# Patient Record
Sex: FEMALE | Race: WHITE | NOT HISPANIC OR LATINO | Employment: OTHER | URBAN - METROPOLITAN AREA
[De-identification: names, ages, dates, MRNs, and addresses within clinical notes are randomized per-mention and may not be internally consistent; named-entity substitution may affect disease eponyms.]

---

## 2021-06-12 ENCOUNTER — NURSE TRIAGE (OUTPATIENT)
Dept: NURSING | Facility: CLINIC | Age: 31
End: 2021-06-12

## 2021-06-12 NOTE — TELEPHONE ENCOUNTER
Explained that I am unable to see her [facility] medical record so advised that she contact her PCP office on Monday to clarify her medication dose.   Nae Grider RN  [facility]

## 2023-08-18 ENCOUNTER — TRANSFERRED RECORDS (OUTPATIENT)
Dept: HEALTH INFORMATION MANAGEMENT | Facility: CLINIC | Age: 33
End: 2023-08-18

## 2023-10-30 ENCOUNTER — MEDICAL CORRESPONDENCE (OUTPATIENT)
Dept: HEALTH INFORMATION MANAGEMENT | Facility: CLINIC | Age: 33
End: 2023-10-30

## 2023-10-30 ENCOUNTER — TRANSFERRED RECORDS (OUTPATIENT)
Dept: HEALTH INFORMATION MANAGEMENT | Facility: CLINIC | Age: 33
End: 2023-10-30

## 2023-10-31 ENCOUNTER — TRANSCRIBE ORDERS (OUTPATIENT)
Dept: MATERNAL FETAL MEDICINE | Facility: CLINIC | Age: 33
End: 2023-10-31

## 2023-10-31 DIAGNOSIS — O26.90 PREGNANCY RELATED CONDITION, ANTEPARTUM: Primary | ICD-10-CM

## 2023-11-08 ENCOUNTER — PRE VISIT (OUTPATIENT)
Dept: MATERNAL FETAL MEDICINE | Facility: CLINIC | Age: 33
End: 2023-11-08
Payer: COMMERCIAL

## 2023-11-08 DIAGNOSIS — Z14.1 CYSTIC FIBROSIS CARRIER: Primary | ICD-10-CM

## 2023-11-17 ENCOUNTER — LAB (OUTPATIENT)
Dept: LAB | Facility: CLINIC | Age: 33
End: 2023-11-17
Attending: OBSTETRICS & GYNECOLOGY
Payer: COMMERCIAL

## 2023-11-17 ENCOUNTER — HOSPITAL ENCOUNTER (OUTPATIENT)
Dept: ULTRASOUND IMAGING | Facility: CLINIC | Age: 33
Discharge: HOME | End: 2023-11-17
Attending: OBSTETRICS & GYNECOLOGY
Payer: COMMERCIAL

## 2023-11-17 ENCOUNTER — OFFICE VISIT (OUTPATIENT)
Dept: MATERNAL FETAL MEDICINE | Facility: CLINIC | Age: 33
End: 2023-11-17
Attending: STUDENT IN AN ORGANIZED HEALTH CARE EDUCATION/TRAINING PROGRAM
Payer: COMMERCIAL

## 2023-11-17 ENCOUNTER — OFFICE VISIT (OUTPATIENT)
Dept: MATERNAL FETAL MEDICINE | Facility: CLINIC | Age: 33
End: 2023-11-17
Attending: OBSTETRICS & GYNECOLOGY
Payer: COMMERCIAL

## 2023-11-17 ENCOUNTER — MEDICAL CORRESPONDENCE (OUTPATIENT)
Dept: HEALTH INFORMATION MANAGEMENT | Facility: CLINIC | Age: 33
End: 2023-11-17

## 2023-11-17 VITALS — SYSTOLIC BLOOD PRESSURE: 128 MMHG | DIASTOLIC BLOOD PRESSURE: 82 MMHG | HEART RATE: 101 BPM

## 2023-11-17 DIAGNOSIS — Z14.1 CYSTIC FIBROSIS CARRIER: ICD-10-CM

## 2023-11-17 DIAGNOSIS — Z36.9 ENCOUNTER FOR ANTENATAL SCREENING OF MOTHER: Primary | ICD-10-CM

## 2023-11-17 DIAGNOSIS — O10.919 CHRONIC HYPERTENSION AFFECTING PREGNANCY: Primary | ICD-10-CM

## 2023-11-17 DIAGNOSIS — Z36.9 ENCOUNTER FOR ANTENATAL SCREENING OF MOTHER: ICD-10-CM

## 2023-11-17 DIAGNOSIS — Z36.2 ENCOUNTER FOR FOLLOW-UP ULTRASOUND OF FETAL ANATOMY: ICD-10-CM

## 2023-11-17 DIAGNOSIS — O26.90 PREGNANCY RELATED CONDITION, ANTEPARTUM: ICD-10-CM

## 2023-11-17 PROCEDURE — 999N000069 HC STATISTIC GENETIC COUNSELING, < 16 MIN

## 2023-11-17 PROCEDURE — 99203 OFFICE O/P NEW LOW 30 MIN: CPT | Mod: 25 | Performed by: STUDENT IN AN ORGANIZED HEALTH CARE EDUCATION/TRAINING PROGRAM

## 2023-11-17 PROCEDURE — 36415 COLL VENOUS BLD VENIPUNCTURE: CPT

## 2023-11-17 PROCEDURE — 76811 OB US DETAILED SNGL FETUS: CPT | Mod: 26 | Performed by: STUDENT IN AN ORGANIZED HEALTH CARE EDUCATION/TRAINING PROGRAM

## 2023-11-17 PROCEDURE — 76811 OB US DETAILED SNGL FETUS: CPT

## 2023-11-17 NOTE — PROGRESS NOTES
"Please see \"Imaging\" tab under \"Chart Review\" for details of today's visit.    Saundra Cai      "

## 2023-11-17 NOTE — NURSING NOTE
Irina here for MFM ultrasound today. Reports feeling off at times and finds it difficult to perform activities at times. She reports taking her blood pressures at home with some being in the 140's/90's and below. B/P today normal as documented. SBAR with Dr. Cai, see her documentation in U/S report for details.    This RN had placed call to [facility] on 11/8 and spoke with KARIN Mcdaniel, Call was placed to clarify if Dr. José desired formal MFM consult for patient. Plan made for clinic to call back if consult is desired as message needed to be routed to provider.    Received call at 1325 today (11/17/23) from Violeta stating Dr. José did want a consult. Advised that patient had already been seen today but is scheduled for follow up ultrasound and formal consult in 2 weeks.

## 2023-11-17 NOTE — PROGRESS NOTES
[facility]  Genetic Counseling Consult    Patient:  Irina Alcantara YOB: 1990   Date of Service:  23   MRN: [id number]    Irina was seen at the [facility] for genetic consultation. The indication for genetic counseling is  known carrier of cystic fibrosis (CFTR c.R117H[5T]) . The patient was accompanied to this visit by her , Renzo.    The session was conducted in English.        IMPRESSION/ PLAN   1. Irina has not had genetic screening in this pregnancy but elected to have screening today.     2. During today's [facility] visit, Irina had a blood draw for expanded non-invasive prenatal screening (NIPS) through [facility]. The expanded NIPS screens for trisomy 21, 18, and 13 and 22q11.2 deletion syndrome. The patient opted to screen for sex chromosome aneuploidies, including reported fetal sex.  In accordance with current guidelines, other microdeletion syndromes and rare autosomal trisomies were not included, but reflex to include these conditions remains available with expanded NIPS if there is an indication later in the pregnancy. Results are expected in 5-10 days. The patient will be called with results and if they do not answer they requested a detailed message with results on their voicemail, including the predicted fetal sex information. Irina was informed that results, including fetal sex, will be available in [facility].    3. Irina had a level II comprehensive anatomy ultrasound today. Please see the ultrasound report for further details.    4. Further recommendation include a follow-up ultrasound with [facility]. The upcoming ultrasound has been scheduled for 2023.    PREGNANCY HISTORY   /Parity:       No bleeding, fevers, or exposures of concern were reported at today's visit. Irina's pregnancy history is significant for:   One chemical pregnancy loss  One  at 37w4d,  complicated by maternal hypertension    CURRENT PREGNANCY   Current Age: 33 year old   Age at Delivery: 33 year old  KASANDRA: 4/4/2024, by Ultrasound                                   Gestational Age: 20w1d  This pregnancy is a single gestation.   This pregnancy was conceived spontaneously.    MEDICAL HISTORY   Irina has a history of gestational hypertension which is continuing to affect her in her current pregnancy. Baseline hypertension labs were taken on 08/18 which were unremarkable. Her primary OB recommended outpatient blood pressure monitoring in addition to being seen by MFM.       FAMILY HISTORY   A three-generation family history was not obtained today due to our focus on other topics and time constraints. Broadly, the reported family history is unremarkable for multiple miscarriages, stillbirths, intellectual disabilities, autism spectrum disorder, developmental delays, cancer diagnosed under 50, and consanguinity. The family history was reported by Irina and Renzo.    Of note, Renzo shared that his father and several paternal aunts/uncles have histories of early onset Alzheimer's and have had positive genetic testing which revealed a pathogenic variant in a gene that causes and increased risk for Alzheimer's (Renzo was unsure which gene). The family has participated in the LI study (Dominantly Inherited Alzheimer Network, [email or web address]). Renzo's father was diagnosed with Alzheimer's in his 50s and has since passed away. Renzo understands that he has a hypothetical 50% chance to have inherited the familial pathogenic variant and has gone back and forth on whether or not he wants to be tested. We did discuss there are also implications for Renzo's children, if he inherited the variant his children would have a 50% chance of also having inherited the variant. We discussed the federal law BRITTA (Genetic Information Nondiscrimination Act) and important gaps in the protections it affords.    Also of note,  "Irina shared a maternal great-aunt has been diagnosed with frontotemporal dementia (FTD). Most cases of FTD are sporadic, however it can also be familial. In 15-40% of familial cases of FTD there is an underlying genetic cause, most commonly pathogenic variants in B3tnh22, MAPT, and GRN. There are additional genes that are more rarely involved. Single gene causes of FTD are inherited in an autosomal dominant manner, meaning first degree relatives of an affected family member would have a 50% chance to also have the familial variant. It is difficult to say whether or not Irina's great-aunt has a single gene cause of FTD, but she would be the best candidate for genetic testing.     Finally, Renzo shared he has a paternal uncle who has a \"small hand\" and Renzo's half-sister's son had to get surgery to repair holes in his heart. We discussed that, since there is no other family history of structural differences to hands or feet, it is more likely that his uncle's smaller hand is due to teratogenic factors or amniotic banding. In terms of his nephew, we discussed that this relative is far enough removed from the current pregnancy that there could be a slightly increased chance for the current pregnancy to have a heart defect compared to the general population but not as high of a chance as it would be for first degree or second degree relatives.     RISK ASSESSMENT FOR CHROMOSOME CONDITIONS   We explained that the risk for fetal chromosome abnormalities increases with maternal age. We discussed specific features of common chromosome abnormalities, including Down syndrome, trisomy 13, trisomy 18, and sex chromosome trisomies.    At age 33 at midtrimester, the risk to have a baby with Down syndrome is 1 in 597.  At age 33 at midtrimester, the risk to have a baby with any chromosome abnormality is 1 in 299.     We also discussed that current ACMG guidelines recommend that screening for 22q11.2 deletion syndrome be offered " to all pregnant patients. 22q11.2 deletion syndrome has an estimated prevalence of 1 in 990 to 1 in 2148 (0.05-0.1%). Risk is not thought to increase with maternal age. Clinical features are variable but include congenital heart defects, cleft palate, developmental delays, immune system deficiencies, and hearing loss. Approximately 90% of cases are de kwame (a sporadic new change in a pregnancy). Cell-free DNA screening for 22q11.2 deletion syndrome is available (Expanded NIPS through [employer]). We discussed the limitations of cell-free DNA screening in detecting microdeletions and the possiblity of false positives and false negatives. The data on this condition is more limited, so there is not a positive or negative predictive value available for results interpretation.    Irina has not had genetic screening in this pregnancy but elected to have screening today.      GENETIC TESTING OPTIONS FOR CHROMOSOMAL CONDITIONS   Genetic testing during a pregnancy includes screening and diagnostic procedures.      Screening tests are non-invasive which means no risk to the pregnancy and includes ultrasounds and blood work. The benefits and limitations of screening were reviewed. Screening tests provide a risk assessment (chance) specific to the pregnancy for certain fetal chromosome abnormalities but cannot definitively diagnose or exclude a fetal chromosome abnormality. Follow-up genetic counseling and consideration of diagnostic testing is recommended with any abnormal screening result. Diagnostic testing during a pregnancy is more certain and can test for more conditions. However, the tests do have a risk of miscarriage that requires careful consideration. These tests can detect fetal chromosome abnormalities with greater than 99% certainty. Results can be compromised by maternal cell contamination or mosaicism and are limited by the resolution of current genetic testing technology.     There is no screening or  diagnostic test that detects all forms of birth defects or intellectual disability.     We discussed the following screening options:   Non-invasive prenatal screening (NIPS)  Also called cell-free DNA screening because it detects chromosomes from the placenta in the pregnant person's blood  Can be done any time after 10 weeks gestation  Screens for trisomy 21, trisomy 18, trisomy 13, and sex chromosome aneuploidies  Expanded NIPS also allows for reflex to include other microdeletion conditions and rare autosomal trisomies if an indication would arise later in the pregnancy. The patient opted to include screening for 22q11.2 deletion syndrome.   Cannot screen for open neural tube defects, maternal serum AFP after 15 weeks is recommended    We discussed the following ultrasound options:  Comprehensive level II ultrasound (Fetal Anatomy Ultrasound)  Ultrasound done between 18-20 weeks gestation  Screens for major birth defects and markers for aneuploidy (like trisomy 21 and trisomy 18)  Includes looking at the fetus/baby's growth, heart, organs (stomach, kidneys), placenta, and amniotic fluid    We discussed the following diagnostic options:   Amniocentesis  Invasive diagnostic procedure done after 15 weeks gestation  The procedure collects a small sample of amniotic fluid for the purpose of chromosomal testing and/or other genetic testing  Diagnostic result; more than 99% sensitivity for fetal chromosome abnormalities  Testing for AFP in the amniotic fluid can test for open neural tube defects    CARRIER SCREENING   Expanded carrier screening is available to screen for autosomal recessive conditions and X-linked conditions in a large list of genes. Autosomal recessive conditions happen when a mutation has been inherited from the egg and sperm and include conditions like cystic fibrosis, thalassemia, hearing loss, spinal muscular atrophy, and more. X-linked conditions happen when a mutation has been inherited from the  egg and include conditions like fragile X syndrome. [place] screening was also reviewed, though Irina was quite familiar with  screening given her son's experience with it.  screening was how her son was found to be a carrier for cystic fibrosis. Irina was subsequently found to also be a carrier of cystic fibrosis (CFTR c.R117H[5T]). About [place] Aspen Screening    We discussed that, since Renzo has not yet had CFTR sequencing, the hypothetical risk for the couple to have a child with cystic fibrosis knowing Irina is a carrier and knowing 1/25 people with  ancestry are carriers is 1/100 (1%). We discussed that if Renzo is found to be a carrier of cystic fibrosis the chance for them to have a child with cystic fibrosis would be 1/4 (25%). If Renzo were found not to be a carrier the risk would be greatly reduced (0.001%). The couple shared that if they knew the pregnancy had cystic fibrosis it would not change their pregnancy decisions and are wanting to wait until  screening.    We also discussed carrier screening for conditions other than cystic fibrosis. The couple declined the carrier screening options shared today. They are aware the option will remain, and they can contact us if they would like to pursue screening. We discussed the following:   Carrier screening does not test the pregnancy but gives a risk assessment for the pregnancy and future pregnancies to have the condition  There are different size panels or list of conditions for carrier screening  Some conditions cause health problems for carriers  Carrier screening does not test for all genetic and health conditions or risk factors  There are limitations to current technology and results may be updated at a later date  If an individual is a carrier, family members could be as well so it may be helpful for the patient to share their results with relatives         It was a pleasure to be involved with Irina s care.  Face-to-face time of the meeting was 35 minutes.    Agatha Brooke MS, [facility]  Maternal Fetal Medicine  Office: [phone]  MFM: [phone]   Fax: [phone]  [facility]    Patient seen, evaluated and discussed with the Genetic Counseling Intern. I have verified the content of the note, which accurately reflects my assessment of the patient and the plan of care.  Supervising Genetic Counselor  Aminta Mei MS, [facility]  Licensed Genetic Counselor   [facility]  Maternal Fetal Medicine  [email or web address]  [email or web address]  Office: [phone]  Pager [phone]  [facility]: [phone]   Fax: [phone]

## 2023-11-22 ENCOUNTER — TELEPHONE (OUTPATIENT)
Dept: MATERNAL FETAL MEDICINE | Facility: CLINIC | Age: 33
End: 2023-11-22
Payer: COMMERCIAL

## 2023-11-22 LAB — SCANNED LAB RESULT: NORMAL

## 2023-11-22 NOTE — CONFIDENTIAL NOTE
November 22, 2023    I left a message for Irina regarding her non-invasive prenatal screen (NIPS) results.     Results indicate NO ANEUPLOIDY DETECTED for chromosomes 21, 18, 13, or sex chromosomes (XX - FEMALE). Results were also negative for 22q11.2 deletion syndrome.    This puts her current pregnancy at low risk for Down syndrome, trisomy 18, trisomy 13, sex chromosome abnormalities, and 22q11.2 deletion syndrome. This test is reported to have the following sensitivities: Down syndrome: 99.99%, trisomy 18: 99.99%, and trisomy 13: 99.99%. Although these results are reassuring, this does not replace a standard chromosome analysis from a chorionic villus sampling or amniocentesis.     Irina is scheduled for a follow-up ultrasound with [facility] on 12/04/2023.    Her results will be made available in her Epic chart for her primary OB to review.       Agatha Brooke MS, [facility]  Maternal Fetal Medicine  Office: [phone]  [facility]: [phone]   Fax: [phone]  [facility]

## 2023-12-04 ENCOUNTER — HOSPITAL ENCOUNTER (OUTPATIENT)
Dept: ULTRASOUND IMAGING | Facility: CLINIC | Age: 33
Discharge: HOME | End: 2023-12-04
Attending: STUDENT IN AN ORGANIZED HEALTH CARE EDUCATION/TRAINING PROGRAM
Payer: COMMERCIAL

## 2023-12-04 ENCOUNTER — OFFICE VISIT (OUTPATIENT)
Dept: MATERNAL FETAL MEDICINE | Facility: CLINIC | Age: 33
End: 2023-12-04
Attending: STUDENT IN AN ORGANIZED HEALTH CARE EDUCATION/TRAINING PROGRAM
Payer: COMMERCIAL

## 2023-12-04 VITALS — OXYGEN SATURATION: 97 % | SYSTOLIC BLOOD PRESSURE: 108 MMHG | HEART RATE: 134 BPM | DIASTOLIC BLOOD PRESSURE: 81 MMHG

## 2023-12-04 DIAGNOSIS — E66.01 MORBIDLY OBESE (H): ICD-10-CM

## 2023-12-04 DIAGNOSIS — O10.919 CHRONIC HYPERTENSION AFFECTING PREGNANCY: ICD-10-CM

## 2023-12-04 DIAGNOSIS — Z36.2 ENCOUNTER FOR FOLLOW-UP ULTRASOUND OF FETAL ANATOMY: ICD-10-CM

## 2023-12-04 DIAGNOSIS — O99.212 MATERNAL OBESITY SYNDROME IN SECOND TRIMESTER: ICD-10-CM

## 2023-12-04 DIAGNOSIS — R00.0 TACHYCARDIA: Primary | ICD-10-CM

## 2023-12-04 PROCEDURE — 76816 OB US FOLLOW-UP PER FETUS: CPT | Mod: 26 | Performed by: OBSTETRICS & GYNECOLOGY

## 2023-12-04 PROCEDURE — 76816 OB US FOLLOW-UP PER FETUS: CPT

## 2023-12-04 PROCEDURE — 99215 OFFICE O/P EST HI 40 MIN: CPT | Mod: 25 | Performed by: OBSTETRICS & GYNECOLOGY

## 2023-12-04 PROCEDURE — 99417 PROLNG OP E/M EACH 15 MIN: CPT | Mod: 25 | Performed by: OBSTETRICS & GYNECOLOGY

## 2023-12-04 PROCEDURE — G0463 HOSPITAL OUTPT CLINIC VISIT: HCPCS | Mod: 25 | Performed by: OBSTETRICS & GYNECOLOGY

## 2023-12-04 NOTE — NURSING NOTE
Patient presents to MFM for MFM consult/RL2 at 22w4d due to CHTN, subopt. Positive fetal movement. Denies LOF, vaginal bleeding or cramping/contractions. SBAR given to MFM MD, see their note in Epic.

## 2023-12-09 ENCOUNTER — HEALTH MAINTENANCE LETTER (OUTPATIENT)
Age: 33
End: 2023-12-09

## 2024-01-17 ENCOUNTER — APPOINTMENT (OUTPATIENT)
Dept: ULTRASOUND IMAGING | Facility: CLINIC | Age: 34
DRG: 833 | End: 2024-01-17
Payer: COMMERCIAL

## 2024-01-17 ENCOUNTER — ORDERS ONLY (AUTO-RELEASED) (OUTPATIENT)
Dept: MATERNAL FETAL MEDICINE | Facility: CLINIC | Age: 34
End: 2024-01-17

## 2024-01-17 ENCOUNTER — HOSPITAL ENCOUNTER (INPATIENT)
Facility: CLINIC | Age: 34
LOS: 1 days | Discharge: HOME | DRG: 833 | End: 2024-01-18
Attending: OBSTETRICS & GYNECOLOGY | Admitting: OBSTETRICS & GYNECOLOGY
Payer: COMMERCIAL

## 2024-01-17 DIAGNOSIS — R00.2 PALPITATIONS: ICD-10-CM

## 2024-01-17 DIAGNOSIS — O10.919 CHRONIC HYPERTENSION IN PREGNANCY: ICD-10-CM

## 2024-01-17 DIAGNOSIS — O10.919 CHRONIC HYPERTENSION IN PREGNANCY: Primary | ICD-10-CM

## 2024-01-17 LAB
ABO/RH(D): NORMAL
ALBUMIN MFR UR ELPH: 17.2 MG/DL
ALBUMIN SERPL BCG-MCNC: 3.4 G/DL (ref 3.5–5.2)
ALP SERPL-CCNC: 87 U/L (ref 40–150)
ALT SERPL W P-5'-P-CCNC: 6 U/L (ref 0–50)
ANION GAP SERPL CALCULATED.3IONS-SCNC: 11 MMOL/L (ref 7–15)
ANTIBODY SCREEN: NEGATIVE
AST SERPL W P-5'-P-CCNC: 9 U/L (ref 0–45)
BASOPHILS # BLD AUTO: 0 10E3/UL (ref 0–0.2)
BASOPHILS NFR BLD AUTO: 0 %
BILIRUB SERPL-MCNC: <0.2 MG/DL
BUN SERPL-MCNC: 5.4 MG/DL (ref 6–20)
CALCIUM SERPL-MCNC: 9 MG/DL (ref 8.6–10)
CHLORIDE SERPL-SCNC: 101 MMOL/L (ref 98–107)
CREAT SERPL-MCNC: 0.55 MG/DL (ref 0.51–0.95)
CREAT UR-MCNC: 148.6 MG/DL
DEPRECATED HCO3 PLAS-SCNC: 20 MMOL/L (ref 22–29)
EGFRCR SERPLBLD CKD-EPI 2021: >90 ML/MIN/1.73M2
EOSINOPHIL # BLD AUTO: 0 10E3/UL (ref 0–0.7)
EOSINOPHIL NFR BLD AUTO: 0 %
ERYTHROCYTE [DISTWIDTH] IN BLOOD BY AUTOMATED COUNT: 12.6 % (ref 10–15)
GLUCOSE SERPL-MCNC: 119 MG/DL (ref 70–99)
HCT VFR BLD AUTO: 36.2 % (ref 35–47)
HGB BLD-MCNC: 11.9 G/DL (ref 11.7–15.7)
IMM GRANULOCYTES # BLD: 0.2 10E3/UL
IMM GRANULOCYTES NFR BLD: 1 %
LYMPHOCYTES # BLD AUTO: 1.8 10E3/UL (ref 0.8–5.3)
LYMPHOCYTES NFR BLD AUTO: 11 %
MCH RBC QN AUTO: 29.9 PG (ref 26.5–33)
MCHC RBC AUTO-ENTMCNC: 32.9 G/DL (ref 31.5–36.5)
MCV RBC AUTO: 91 FL (ref 78–100)
MONOCYTES # BLD AUTO: 0.3 10E3/UL (ref 0–1.3)
MONOCYTES NFR BLD AUTO: 2 %
NEUTROPHILS # BLD AUTO: 14.4 10E3/UL (ref 1.6–8.3)
NEUTROPHILS NFR BLD AUTO: 86 %
NRBC # BLD AUTO: 0 10E3/UL
NRBC BLD AUTO-RTO: 0 /100
PLATELET # BLD AUTO: 283 10E3/UL (ref 150–450)
POTASSIUM SERPL-SCNC: 4.3 MMOL/L (ref 3.4–5.3)
PROT SERPL-MCNC: 6.5 G/DL (ref 6.4–8.3)
PROT/CREAT 24H UR: 0.12 MG/MG CR (ref 0–0.2)
RBC # BLD AUTO: 3.98 10E6/UL (ref 3.8–5.2)
SODIUM SERPL-SCNC: 132 MMOL/L (ref 135–145)
SPECIMEN EXPIRATION DATE: NORMAL
TSH SERPL DL<=0.005 MIU/L-ACNC: 1.34 UIU/ML (ref 0.3–4.2)
WBC # BLD AUTO: 16.7 10E3/UL (ref 4–11)

## 2024-01-17 PROCEDURE — 84156 ASSAY OF PROTEIN URINE: CPT

## 2024-01-17 PROCEDURE — 84443 ASSAY THYROID STIM HORMONE: CPT

## 2024-01-17 PROCEDURE — 99222 1ST HOSP IP/OBS MODERATE 55: CPT | Mod: 25 | Performed by: OBSTETRICS & GYNECOLOGY

## 2024-01-17 PROCEDURE — G0463 HOSPITAL OUTPT CLINIC VISIT: HCPCS | Mod: 25

## 2024-01-17 PROCEDURE — 85025 COMPLETE CBC W/AUTO DIFF WBC: CPT

## 2024-01-17 PROCEDURE — 120N000002 HC R&B MED SURG/OB UMMC

## 2024-01-17 PROCEDURE — 250N000013 HC RX MED GY IP 250 OP 250 PS 637

## 2024-01-17 PROCEDURE — 76816 OB US FOLLOW-UP PER FETUS: CPT

## 2024-01-17 PROCEDURE — 93248 EXT ECG>7D<15D REV&INTERPJ: CPT | Performed by: INTERNAL MEDICINE

## 2024-01-17 PROCEDURE — 250N000011 HC RX IP 250 OP 636

## 2024-01-17 PROCEDURE — 36415 COLL VENOUS BLD VENIPUNCTURE: CPT

## 2024-01-17 PROCEDURE — 258N000003 HC RX IP 258 OP 636

## 2024-01-17 PROCEDURE — 86900 BLOOD TYPING SEROLOGIC ABO: CPT

## 2024-01-17 PROCEDURE — 59025 FETAL NON-STRESS TEST: CPT | Mod: 26 | Performed by: OBSTETRICS & GYNECOLOGY

## 2024-01-17 PROCEDURE — 87653 STREP B DNA AMP PROBE: CPT

## 2024-01-17 PROCEDURE — 80053 COMPREHEN METABOLIC PANEL: CPT

## 2024-01-17 PROCEDURE — 76816 OB US FOLLOW-UP PER FETUS: CPT | Mod: 26 | Performed by: OBSTETRICS & GYNECOLOGY

## 2024-01-17 RX ORDER — METOCLOPRAMIDE HYDROCHLORIDE 5 MG/ML
10 INJECTION INTRAMUSCULAR; INTRAVENOUS EVERY 6 HOURS PRN
Status: DISCONTINUED | OUTPATIENT
Start: 2024-01-17 | End: 2024-01-18 | Stop reason: HOSPADM

## 2024-01-17 RX ORDER — HYDROXYZINE HYDROCHLORIDE 50 MG/1
50 TABLET, FILM COATED ORAL
Status: DISCONTINUED | OUTPATIENT
Start: 2024-01-17 | End: 2024-01-17

## 2024-01-17 RX ORDER — LABETALOL 100 MG/1
100 TABLET, FILM COATED ORAL EVERY 8 HOURS SCHEDULED
Status: DISCONTINUED | OUTPATIENT
Start: 2024-01-17 | End: 2024-01-17

## 2024-01-17 RX ORDER — ONDANSETRON 4 MG/1
4 TABLET, ORALLY DISINTEGRATING ORAL EVERY 6 HOURS PRN
Status: DISCONTINUED | OUTPATIENT
Start: 2024-01-17 | End: 2024-01-18 | Stop reason: HOSPADM

## 2024-01-17 RX ORDER — GUANFACINE 1 MG/1
1 TABLET ORAL AT BEDTIME
COMMUNITY

## 2024-01-17 RX ORDER — POLYETHYLENE GLYCOL 3350 17 G/17G
17 POWDER, FOR SOLUTION ORAL DAILY
Status: DISCONTINUED | OUTPATIENT
Start: 2024-01-17 | End: 2024-01-18 | Stop reason: HOSPADM

## 2024-01-17 RX ORDER — PROCHLORPERAZINE MALEATE 10 MG
10 TABLET ORAL EVERY 6 HOURS PRN
Status: DISCONTINUED | OUTPATIENT
Start: 2024-01-17 | End: 2024-01-18 | Stop reason: HOSPADM

## 2024-01-17 RX ORDER — LIDOCAINE 40 MG/G
CREAM TOPICAL
Status: DISCONTINUED | OUTPATIENT
Start: 2024-01-17 | End: 2024-01-18 | Stop reason: HOSPADM

## 2024-01-17 RX ORDER — ONDANSETRON 2 MG/ML
4 INJECTION INTRAMUSCULAR; INTRAVENOUS EVERY 6 HOURS PRN
Status: DISCONTINUED | OUTPATIENT
Start: 2024-01-17 | End: 2024-01-18 | Stop reason: HOSPADM

## 2024-01-17 RX ORDER — HYDROXYZINE HYDROCHLORIDE 50 MG/1
50 TABLET, FILM COATED ORAL 3 TIMES DAILY PRN
COMMUNITY

## 2024-01-17 RX ORDER — ASPIRIN 81 MG/1
81 TABLET, CHEWABLE ORAL AT BEDTIME
Status: DISCONTINUED | OUTPATIENT
Start: 2024-01-17 | End: 2024-01-18 | Stop reason: HOSPADM

## 2024-01-17 RX ORDER — POLYETHYLENE GLYCOL 3350 17 G/17G
17 POWDER, FOR SOLUTION ORAL
Status: DISCONTINUED | OUTPATIENT
Start: 2024-01-17 | End: 2024-01-18 | Stop reason: HOSPADM

## 2024-01-17 RX ORDER — PRENATAL VIT/IRON FUM/FOLIC AC 27MG-0.8MG
1 TABLET ORAL DAILY
Status: DISCONTINUED | OUTPATIENT
Start: 2024-01-17 | End: 2024-01-17

## 2024-01-17 RX ORDER — PANTOPRAZOLE SODIUM 40 MG/1
40 TABLET, DELAYED RELEASE ORAL AT BEDTIME
Status: DISCONTINUED | OUTPATIENT
Start: 2024-01-17 | End: 2024-01-18 | Stop reason: HOSPADM

## 2024-01-17 RX ORDER — LABETALOL 100 MG/1
100 TABLET, FILM COATED ORAL EVERY 12 HOURS SCHEDULED
Status: DISCONTINUED | OUTPATIENT
Start: 2024-01-17 | End: 2024-01-17

## 2024-01-17 RX ORDER — BETAMETHASONE SODIUM PHOSPHATE AND BETAMETHASONE ACETATE 3; 3 MG/ML; MG/ML
12 INJECTION, SUSPENSION INTRA-ARTICULAR; INTRALESIONAL; INTRAMUSCULAR; SOFT TISSUE ONCE
Status: DISCONTINUED | OUTPATIENT
Start: 2024-01-17 | End: 2024-01-17

## 2024-01-17 RX ORDER — VENLAFAXINE HYDROCHLORIDE 150 MG/1
150 CAPSULE, EXTENDED RELEASE ORAL
Status: DISCONTINUED | OUTPATIENT
Start: 2024-01-17 | End: 2024-01-17

## 2024-01-17 RX ORDER — VENLAFAXINE HYDROCHLORIDE 150 MG/1
150 CAPSULE, EXTENDED RELEASE ORAL AT BEDTIME
Status: DISCONTINUED | OUTPATIENT
Start: 2024-01-17 | End: 2024-01-18 | Stop reason: HOSPADM

## 2024-01-17 RX ORDER — MAGNESIUM SULFATE HEPTAHYDRATE 40 MG/ML
2 INJECTION, SOLUTION INTRAVENOUS
Status: DISCONTINUED | OUTPATIENT
Start: 2024-01-17 | End: 2024-01-18 | Stop reason: HOSPADM

## 2024-01-17 RX ORDER — MAGNESIUM SULFATE IN WATER 40 MG/ML
2 INJECTION, SOLUTION INTRAVENOUS CONTINUOUS
Status: DISCONTINUED | OUTPATIENT
Start: 2024-01-17 | End: 2024-01-17

## 2024-01-17 RX ORDER — SODIUM CHLORIDE, SODIUM LACTATE, POTASSIUM CHLORIDE, CALCIUM CHLORIDE 600; 310; 30; 20 MG/100ML; MG/100ML; MG/100ML; MG/100ML
10-125 INJECTION, SOLUTION INTRAVENOUS CONTINUOUS
Status: DISCONTINUED | OUTPATIENT
Start: 2024-01-17 | End: 2024-01-18 | Stop reason: HOSPADM

## 2024-01-17 RX ORDER — PROCHLORPERAZINE 25 MG
25 SUPPOSITORY, RECTAL RECTAL EVERY 12 HOURS PRN
Status: DISCONTINUED | OUTPATIENT
Start: 2024-01-17 | End: 2024-01-18 | Stop reason: HOSPADM

## 2024-01-17 RX ORDER — AMOXICILLIN 250 MG
1 CAPSULE ORAL 2 TIMES DAILY
Status: DISCONTINUED | OUTPATIENT
Start: 2024-01-17 | End: 2024-01-18 | Stop reason: HOSPADM

## 2024-01-17 RX ORDER — PRENATAL VIT/IRON FUM/FOLIC AC 27MG-0.8MG
1 TABLET ORAL AT BEDTIME
Status: DISCONTINUED | OUTPATIENT
Start: 2024-01-17 | End: 2024-01-18 | Stop reason: HOSPADM

## 2024-01-17 RX ORDER — BISACODYL 10 MG
10 SUPPOSITORY, RECTAL RECTAL DAILY PRN
Status: DISCONTINUED | OUTPATIENT
Start: 2024-01-19 | End: 2024-01-18 | Stop reason: HOSPADM

## 2024-01-17 RX ORDER — GUANFACINE 1 MG/1
1 TABLET, EXTENDED RELEASE ORAL AT BEDTIME
Status: DISCONTINUED | OUTPATIENT
Start: 2024-01-17 | End: 2024-01-18 | Stop reason: HOSPADM

## 2024-01-17 RX ORDER — CALCIUM GLUCONATE 94 MG/ML
1 INJECTION, SOLUTION INTRAVENOUS
Status: DISCONTINUED | OUTPATIENT
Start: 2024-01-17 | End: 2024-01-18 | Stop reason: HOSPADM

## 2024-01-17 RX ORDER — HYDROXYZINE HYDROCHLORIDE 50 MG/1
50 TABLET, FILM COATED ORAL
Status: DISCONTINUED | OUTPATIENT
Start: 2024-01-17 | End: 2024-01-18 | Stop reason: HOSPADM

## 2024-01-17 RX ORDER — VENLAFAXINE HYDROCHLORIDE 150 MG/1
150 TABLET, EXTENDED RELEASE ORAL DAILY
COMMUNITY

## 2024-01-17 RX ORDER — LABETALOL 100 MG/1
100 TABLET, FILM COATED ORAL 2 TIMES DAILY
COMMUNITY

## 2024-01-17 RX ORDER — MAGNESIUM SULFATE HEPTAHYDRATE 40 MG/ML
4 INJECTION, SOLUTION INTRAVENOUS
Status: DISCONTINUED | OUTPATIENT
Start: 2024-01-17 | End: 2024-01-18 | Stop reason: HOSPADM

## 2024-01-17 RX ORDER — BETAMETHASONE SODIUM PHOSPHATE AND BETAMETHASONE ACETATE 3; 3 MG/ML; MG/ML
12 INJECTION, SUSPENSION INTRA-ARTICULAR; INTRALESIONAL; INTRAMUSCULAR; SOFT TISSUE ONCE
Status: COMPLETED | OUTPATIENT
Start: 2024-01-17 | End: 2024-01-17

## 2024-01-17 RX ORDER — ACETAMINOPHEN 325 MG/1
650 TABLET ORAL EVERY 4 HOURS PRN
Status: DISCONTINUED | OUTPATIENT
Start: 2024-01-17 | End: 2024-01-18 | Stop reason: HOSPADM

## 2024-01-17 RX ORDER — METOCLOPRAMIDE 10 MG/1
10 TABLET ORAL EVERY 6 HOURS PRN
Status: DISCONTINUED | OUTPATIENT
Start: 2024-01-17 | End: 2024-01-18 | Stop reason: HOSPADM

## 2024-01-17 RX ORDER — LABETALOL 100 MG/1
100 TABLET, FILM COATED ORAL EVERY 12 HOURS SCHEDULED
Status: DISCONTINUED | OUTPATIENT
Start: 2024-01-17 | End: 2024-01-18 | Stop reason: HOSPADM

## 2024-01-17 RX ORDER — LABETALOL HYDROCHLORIDE 5 MG/ML
20-40 INJECTION, SOLUTION INTRAVENOUS EVERY 10 MIN PRN
Status: DISCONTINUED | OUTPATIENT
Start: 2024-01-17 | End: 2024-01-18 | Stop reason: HOSPADM

## 2024-01-17 RX ORDER — ASPIRIN 81 MG/1
162 TABLET ORAL DAILY
Status: ON HOLD | COMMUNITY
End: 2024-03-16

## 2024-01-17 RX ORDER — HYDRALAZINE HYDROCHLORIDE 20 MG/ML
10 INJECTION INTRAMUSCULAR; INTRAVENOUS
Status: DISCONTINUED | OUTPATIENT
Start: 2024-01-17 | End: 2024-01-18 | Stop reason: HOSPADM

## 2024-01-17 RX ORDER — AMOXICILLIN 250 MG
2 CAPSULE ORAL 2 TIMES DAILY
Status: DISCONTINUED | OUTPATIENT
Start: 2024-01-17 | End: 2024-01-18 | Stop reason: HOSPADM

## 2024-01-17 RX ADMIN — PRENATAL VIT W/ FE FUMARATE-FA TAB 27-0.8 MG 1 TABLET: 27-0.8 TAB at 20:21

## 2024-01-17 RX ADMIN — LABETALOL HYDROCHLORIDE 100 MG: 100 TABLET, FILM COATED ORAL at 05:51

## 2024-01-17 RX ADMIN — HYDROXYZINE HYDROCHLORIDE 50 MG: 50 TABLET, FILM COATED ORAL at 05:51

## 2024-01-17 RX ADMIN — ACETAMINOPHEN 650 MG: 325 TABLET, FILM COATED ORAL at 05:51

## 2024-01-17 RX ADMIN — PANTOPRAZOLE SODIUM 40 MG: 40 TABLET, DELAYED RELEASE ORAL at 05:11

## 2024-01-17 RX ADMIN — DOXYLAMINE SUCCINATE 25 MG: 25 TABLET ORAL at 05:11

## 2024-01-17 RX ADMIN — PANTOPRAZOLE SODIUM 40 MG: 40 TABLET, DELAYED RELEASE ORAL at 20:22

## 2024-01-17 RX ADMIN — GUANFACINE 1 MG: 1 TABLET, EXTENDED RELEASE ORAL at 20:20

## 2024-01-17 RX ADMIN — ASPIRIN 81 MG CHEWABLE TABLET 81 MG: 81 TABLET CHEWABLE at 05:11

## 2024-01-17 RX ADMIN — VENLAFAXINE HYDROCHLORIDE 150 MG: 150 CAPSULE, EXTENDED RELEASE ORAL at 05:11

## 2024-01-17 RX ADMIN — SODIUM CHLORIDE, POTASSIUM CHLORIDE, SODIUM LACTATE AND CALCIUM CHLORIDE 50 ML/HR: 600; 310; 30; 20 INJECTION, SOLUTION INTRAVENOUS at 05:15

## 2024-01-17 RX ADMIN — LABETALOL HYDROCHLORIDE 100 MG: 100 TABLET, FILM COATED ORAL at 20:22

## 2024-01-17 RX ADMIN — PRENATAL VIT W/ FE FUMARATE-FA TAB 27-0.8 MG 1 TABLET: 27-0.8 TAB at 05:11

## 2024-01-17 RX ADMIN — ACETAMINOPHEN 650 MG: 325 TABLET, FILM COATED ORAL at 17:57

## 2024-01-17 RX ADMIN — MAGNESIUM SULFATE HEPTAHYDRATE 2 G/HR: 40 INJECTION, SOLUTION INTRAVENOUS at 05:04

## 2024-01-17 RX ADMIN — GUANFACINE 1 MG: 1 TABLET, EXTENDED RELEASE ORAL at 05:11

## 2024-01-17 RX ADMIN — VENLAFAXINE HYDROCHLORIDE 150 MG: 150 CAPSULE, EXTENDED RELEASE ORAL at 20:20

## 2024-01-17 RX ADMIN — ASPIRIN 81 MG CHEWABLE TABLET 81 MG: 81 TABLET CHEWABLE at 20:21

## 2024-01-17 RX ADMIN — HYDROXYZINE HYDROCHLORIDE 50 MG: 50 TABLET, FILM COATED ORAL at 20:22

## 2024-01-17 RX ADMIN — ACETAMINOPHEN 650 MG: 325 TABLET, FILM COATED ORAL at 22:29

## 2024-01-17 RX ADMIN — DOXYLAMINE SUCCINATE 25 MG: 25 TABLET ORAL at 20:21

## 2024-01-17 RX ADMIN — BETAMETHASONE SODIUM PHOSPHATE AND BETAMETHASONE ACETATE 12 MG: 3; 3 INJECTION, SUSPENSION INTRA-ARTICULAR; INTRALESIONAL; INTRAMUSCULAR at 22:30

## 2024-01-17 ASSESSMENT — ACTIVITIES OF DAILY LIVING (ADL)
ADLS_ACUITY_SCORE: 20
ADLS_ACUITY_SCORE: 35
ADLS_ACUITY_SCORE: 20

## 2024-01-17 NOTE — PLAN OF CARE
Patient stable this shift.  VSS. BP WDL, see flow sheet for  Denies LOF, cramping/deana or vaginal bleeding. See flow sheet for FHR and contraction pattern.  Offers no complaints.  Continue with routine cares and will notify provider if there is a change in status.

## 2024-01-17 NOTE — DISCHARGE SUMMARY
[facility] Discharge Summary    Irina Alcantara MRN# [id number]   Age: 33 year old YOB: 1990     Date of Admission:  2024  Date of Discharge::  2024  Admitting Physician:  Elvis Braden MD  Discharge Physician:  Elvis Braden MD            Admission Diagnoses:     IUP at 28w6d  Chronic hypertension  Tachycardia  Asthma  Tobacco use in first trimester  MDD/HELEN  Hx term CS x1  Varicella nonimmune  CF carrier  Mild Polyhydramnios          Discharge Diagnosis:   Same as above         Procedures:     Procedure(s): Comprehensive US           Medications Prior to Admission:     Medications Prior to Admission   Medication Sig Dispense Refill Last Dose    albuterol (PROVENTIL HFA: VENTOLIN HFA) 108 (90 BASE) MCG/ACT inhaler Inhale 2 puffs into the lungs every 4 hours as needed for shortness of breath / dyspnea. 1 Inhaler 1 More than a month    aspirin 81 MG EC tablet Take 162 mg by mouth daily   Past Week    doxylamine (UNISOM) 25 MG TABS tablet Take 25 mg by mouth at bedtime   Past Week    guanFACINE (TENEX) 1 MG tablet Take 1 mg by mouth at bedtime   Past Week    hydrOXYzine HCl (ATARAX) 50 MG tablet Take 50 mg by mouth 3 times daily as needed for itching   Past Week    labetalol (NORMODYNE) 100 MG tablet Take 100 mg by mouth 2 times daily   Past Week    Omeprazole 20 MG tablet Take 20 mg by mouth 2 times daily.   Past Week    venlafaxine (EFFEXOR-ER) 150 MG 24 hr tablet Take 150 mg by mouth daily   Past Week    NO ACTIVE MEDICATIONS        [DISCONTINUED] ethynodiol-ethinyl estradiol (KELNOR) 1-35 MG-MCG per tablet Take 1 tablet by mouth daily. 3 Package 02 Past Week    [DISCONTINUED] omeprazole (PRILOSEC) 40 MG capsule Take 1 capsule by mouth daily. Half hour prior to first meal. 60 capsule 0 Past Week             Discharge Medications:        Review of your medicines        CONTINUE these medicines which may have CHANGED, or have new prescriptions. If we are uncertain of  the size of tablets/capsules you have at home, strength may be listed as something that might have changed.        Dose / Directions   omeprazole 20 MG tablet  This may have changed: Another medication with the same name was removed. Continue taking this medication, and follow the directions you see here.      Dose: 20 mg  Take 20 mg by mouth 2 times daily.  Refills: 0            CONTINUE these medicines which have NOT CHANGED        Dose / Directions   albuterol 108 (90 Base) MCG/ACT inhaler  Commonly known as: PROAIR HFA/PROVENTIL HFA/VENTOLIN HFA  Used for: Intermittent asthma      Dose: 2 puff  Inhale 2 puffs into the lungs every 4 hours as needed for shortness of breath / dyspnea.  Quantity: 1 Inhaler  Refills: 1     aspirin 81 MG EC tablet      Dose: 162 mg  Take 162 mg by mouth daily  Refills: 0     doxylamine 25 MG Tabs tablet  Commonly known as: UNISOM      Dose: 25 mg  Take 25 mg by mouth at bedtime  Refills: 0     guanFACINE 1 MG tablet  Commonly known as: TENEX      Dose: 1 mg  Take 1 mg by mouth at bedtime  Refills: 0     hydrOXYzine HCl 50 MG tablet  Commonly known as: ATARAX      Dose: 50 mg  Take 50 mg by mouth 3 times daily as needed for itching  Refills: 0     labetalol 100 MG tablet  Commonly known as: NORMODYNE      Dose: 100 mg  Take 100 mg by mouth 2 times daily  Refills: 0     NO ACTIVE MEDICATIONS      Refills: 0     venlafaxine 150 MG 24 hr tablet  Commonly known as: EFFEXOR-ER      Dose: 150 mg  Take 150 mg by mouth daily  Refills: 0            STOP taking      ethynodiol-ethinyl estradiol 1-35 MG-MCG tablet  Commonly known as: KELNOR                       Consultations:   No consultations were requested during this admission          Brief History of Admission and Antepartum Course:   Irina Alcantara is a 33 year old  at 28w6d by 7w1d US who transferred from [facility] due to concern for superimposed preeclampsia with severe features. She presented to the outside hospital the  evening of 1/16 with headache, RUQ pain, and swelling of hands. There, she was found to have a SR blood pressure. Patient has a history of chronic hypertension, for which she is prescribed PTA PO labetalol 100 mg BID, however patient reports only taking it once daily. Prior to transfer she received IV labetalol, one dose of BMZ, and started on magnesium sulfate infusion.    On arrival here, she was normotensive but tachycardic to 100-110s. HELLP labs, TSH, and UPC were wnl. She was continued on magnesium sulfate infusion for neuroprotection and received a total of 12 hours of this before it was discontinued. She received the other dose of BMZ for fetal lung maturity (1/16-17). Given patient's normal BP, HELLP labs, and UPC, clinical picture was more suggestive of chronic hypertension exacerbation secondary to taking less BP medications than prescribed. Given this and the improvement of her headache and RUQ pain, the patient was deemed medically appropriate for discharge home. A zio patch was ordered in the context of patient's tachycardia and perceived palpitations, this will be sent to her address with instructions.    She was continued on her PTA enlafaxine and guanfacine for MDD/HELEN.          Discharge Instructions and Follow-Up:     Discharge Instructions:  Call or present to labor and delivery if you experience:   -Regular painful contractions concerning for labor   -Leakage of fluid concerning for ruptured membranes   -Decreased fetal movement   -Bright red vaginal bleeding    -Headache, vision changes, upper abdominal pain, significant increase in swelling,   generalized unwell feeling    - Zio patch for cardiac monitoring mailed to her house with instructions of how to apply.          Discharge Disposition:     Discharged to home.        Nallely Edwards MD  OB/GYN Resident PGY-4  6:51 PM January 18, 2024

## 2024-01-17 NOTE — H&P
[facility] HISTORY AND PHYSICAL    Patient: Irina Alcantara   MRN#: [id number]  YOB: 1990     HPI: Irina Alcantara is a 33 year old  at 28w6d by 7w1d US who presents today as a transfer for [place]. She has a history of chronic hypertension, for which she takes PO labetalol 100 mg BID. She presented to that outside hospital last evening, where she was found to have SR BP. She reportedly received IV labetalol 20 mg with good response. Given concern for superimposed preeclampsia with severe features, a magnesium sulfate infusion was started, and dose #1 of IM betamethasone was given prior to transfer to [facility] for further evaluation and monitoring.     Had a headache and some RUQ discomfort earlier today. Has more swelling in her hands, had to take her ring off earlier tonight. No current vision changes, CP, SOB.    She reports good fetal movement. Denies LOF, vaginal bleeding, or contractions .      Pregnancy notable for:   - Chronic hypertension  - Palpitations/tachycardia  - Obesity  - Asthma, mild  - Tobacco use in first trimester  - MDD/HELEN  - Hx term CS x1  - Varicella nonimmune  - CF carrier  - Mild polyhydramnios    History of mild asthma, not requiring an inhaler in this pregnancy.    OBGYN History:    s/p:  OB History    Para Term  AB Living   3 1 1 0 1 1   SAB IAB Ectopic Multiple Live Births   1 0 0 0 1      # Outcome Date GA Lbr Real/2nd Weight Sex Delivery Anes PTL Lv   3 Current            2 SAB      SAB      1 Term      CS-Unspec   MIRELLA      - Last Pap: NILM and HPV negative at [facility] 2023    Prenatal Lab Results:  Lab Results   Component Value Date    CHPCRT  2011     Negative for C. trachomatis rRNA by transcription mediated amplification.   A negative result by transcription mediated amplification does not preclude the   presence of C. trachomatis infection because results are dependent on proper   and adequate collection, absence of  inhibitors, and sufficient rRNA to be   detected.   A negative urine result for a female patient who is clinically suspected of   having a chlamydial infection does not rule out the presence of C. trachomatis   in the urogenital tract.    GCPCRT  11/03/2011     Negative for N. gonorrhoeae rRNA by transcription mediated amplification.   A negative result by transcription mediated amplification does not preclude the   presence of N. gonorrhoeae infection because results are dependent on proper   and adequate collection, absence of inhibitors, and sufficient rRNA to be   detected.   A negative urine result for a female patient who is clinically suspect of   having a gonococcal infection does not rule out the presence of N. gonorrhoeae   in the urogenital tract.    HGB 11.9 01/17/2024     GBS Status: unknown    Patient Active Problem List    Diagnosis Date Noted    Chronic hypertension in pregnancy 01/17/2024     Priority: Medium    CARDIOVASCULAR SCREENING; LDL GOAL LESS THAN 160 11/03/2011     Priority: Medium    GERD (gastroesophageal reflux disease) 11/03/2011     Priority: Medium    Intermittent asthma 11/03/2011     Priority: Medium     Past Medical History  Past Medical History:   Diagnosis Date    Depressive disorder     Hypertension     Uncomplicated asthma      Past Surgical History  History reviewed. No pertinent surgical history.  Family History  History reviewed. No pertinent family history.  Social History  Social History     Tobacco Use    Smoking status: Some Days     Types: Cigarettes    Smokeless tobacco: Never   Substance Use Topics    Alcohol use: No     Medications  Medications Prior to Admission   Medication Sig Dispense Refill Last Dose    albuterol (PROVENTIL HFA: VENTOLIN HFA) 108 (90 BASE) MCG/ACT inhaler Inhale 2 puffs into the lungs every 4 hours as needed for shortness of breath / dyspnea. 1 Inhaler 1 More than a month    ethynodiol-ethinyl estradiol (KELNOR) 1-35 MG-MCG per tablet Take 1  tablet by mouth daily. 3 Package 02 2024    omeprazole (PRILOSEC) 40 MG capsule Take 1 capsule by mouth daily. Half hour prior to first meal. 60 capsule 0 2024    Omeprazole 20 MG tablet Take 20 mg by mouth 2 times daily.   2024    NO ACTIVE MEDICATIONS         Allergies  Allergies   Allergen Reactions    Trazodone Itching        REVIEW OF SYSTEMS:  A 10 point review of systems was completed and was negative other than as noted in the HPI.    PHYSICAL EXAM    Vitals pending validation - /74, 122/68 and pulse 106 on arrival to antepartum unit    Gen: NAD  CV: RRR, nl S1/S2, no murmurs/clicks/gallops  Lungs: CTAB, non-labored breathing  Abd: Gravid, very mild RUQ tenderness, non-distended  Ext: Trace peripheral extremity edema; 2+ BUE DTRs    SVE: deferred  Membranes: intact  Presentation: variable on last formal US, repeat pending in AM  Estimated Fetal Weight: 82%ile on last formal US, repeat pending in AM    FHT:  Monitoring External  FHT: Baseline 130 bpm; moderate variability; accels preesnt; no decelerations  TOCO: no contractions in 10 minutes    Studies:   - will obtain T&S, CBC, CMP, UPC on admission    - recent echocardiogram within normal limits outpatient  -----  ECHOCARDIOGRAM       BRITTNI ANDRADE   MRN:    [id number]         Accession#:   [id number]   :    1990 33 years Study Date:   2023 3:01:59 PM   Gender: F                  BP:           122/97 mmHg   Height: 168.00 cm          BSA:          2.33 mÂ    Weight: 130.00 kg          Tech:         JATINDER                              Referring MD: EMIL RAINES   Site:             [facility]   Reading Location: Mobile-OP   Patient Location: Outpatient.       Procedure: 2D, Color Doppler and Spectral Doppler.     Indication for study: Tachycardia   Cardiac Rhythm: Sinus tachycardia.Study quality: Technically limited.   Imaging limitations: This study was subject to imaging limitations due to body  habitus and a prominent lung artifact.     Final Impressions:    1. Technically limited exam.    2. Normal LV size, normal wall thickness, normal function with an estimated EF of 60 - 65%.    3. Right ventricular cavity size is normal, global systolic RV function is normal.    4. No significant valve disease detected.    5. Normal estimated CVP.     Comparison   There are no prior studies on this patient for comparison purposes.     Chamber Sizes and Function   Normal left ventricular size, normal wall thickness, normal global systolic function with an estimated EF of 60 - 65%. Left atrial size is normal. Left atrial pressure is normal. Right ventricular cavity size is normal, global systolic RV function is normal. RV wall thickness is normal. The right atrium is normal. The pulmonary artery is of normal size and origin. The sinus of Valsalva is normal sized. The ascending aorta is normal sized.     Valves, RV Pressures and Diastolic Function     The aortic valve is normal in structure and trileaflet, no stenosis and no regurgitation. The mitral valve is normal in structure, no mitral regurgitation. Normal diastolic function. The tricuspid valve is normal in structure. Tricuspid regurgitation is trace regurgitation. The pulmonic valve is normal. Trace pulmonary regurgitation. Pulmonary veins show a normal flow pattern.     Masses, Effusion, Shunts   There is no pericardial effusion. Prominent epicardial fat pad. The inferior vena cava is normal sized, respiratory size variation greater than 50%. No left to right shunting was detected by limited color flow Doppler interrogation of the interatrial septum. No masses or clots seen.     MEASUREMENTS AND CALCULATIONS     2-D Measurements and LV Function:     LVID (d) 3.6 cm LV FS% (2D)   36 %   LVID (s) 2.3 cm LVOT diameter 2.0 cm   IVS (d)  1.2 cm HR            110 bpm   LVPW (d) 1.2 cm   Ao Sinus 3.0 cm   Asc Ao   3.1 cm   LA       3.1 cm     Diastology:   Tissue  Doppler   e', Lateral    0.14 m/s     Aortic Valve:     Vmax       1.4 m/s  REBECA (V)   2.50 cmÂ    VTI        0.22 m   REBECA (I)   2.17 cmÂ    LVOT V max 1.1 m/s  Max PG    8 mmHg   LVOT VTI   0.16 m   Mean PG   4 mmHg   SV         49 ml    Dim Index 0.71   SV index   21 ml/mÂ  CO        5.4 l/min                       CI        2.3 l/min/mÂ        Electronically signed by Ag Weaver MD on 2023 4:15:53 PM.       This study was interpreted by an [facility] accredited facility.   -----      - Prior [facility] US reports:    COMP (2023 at 20w1d)  1. Bang intrauterine pregnancy at 20w 1d gestational age here for evaluation of fetal anatomy.  2. No fetal anomalies commonly detected by ultrasound or soft markers of aneuploidy were identified in the detailed fetal anatomic survey within the limits of prenatal  ultrasound, however some views were suboptimal, as described above.  3. Growth parameters and estimated fetal weight were consistent with established dates.  4. The amniotic fluid volume appeared normal.  5. On transabdominal imaging the cervix appears long and closed.    F/UP COMP (2023 at 22w4d)  1. Bang intrauterine pregnancy at 22 4/7 weeks gestational age here for completion of fetal anatomy secondary to CHTN, maternal BMI 44 kg/m2, nicotine dependency,  asthma, anxiety and depression.  2. The remaining fetal anatomic survey was completed, no fetal anomalies commonly detected by ultrasound were identified within the limits of prenatal ultrasound.  3. Growth parameters and estimated fetal weight were consistent with established dates.  4. The amniotic fluid volume appeared increased and consistent with mild polyhydramnios.        Assessment & Plan: 33 year old  at 28w6d by 7w1d admitted for evaluation of possible superimposed preeclampsia with severe features after presenting to an outside hospital with severe range blood pressures. Received IV labetalol x1 and BMZ x1, also started on  magnesium sulfate infusion at OSH; paused for transfer but will re-start here pending further workup.  Pregnancy is notable for:   - Chronic hypertension  - Palpitations/tachycardia  - Obesity  - Asthma, mild  - MDD/HELEN  - Hx term CS x1  - Mild polyhydramnios    # Chronic hypertension  # R/o Superimposed preeclampsia with severe features  - Serial BP monitoring  - BPs: normotensive on arrival to [facility]  - Antihypertensives:   - PTA labetalol 100 BID> D#1 100 TID  - IV antihypertensives PRN for sustained severe range blood pressures  - Labs: HELLP labs, UPC   - AST was mildly elevated to 81 with NOB labs on 8/18/2023, subsequently normal at 25 on re-check x2   - baseline labs otherwise normal  - Daily weights, strict I&Os  - Continue magnesium sulfate seizure ppx- s/p 4g loading dose, now on 2g per hour maintenance   - Mg checks q4h, check serum level as indicated   - Will continue for 24hrs pending above workup for severe features of superimposed preeclampsia  - Continue PTA ASA  - Delivery timing/mode pending clinical course    # Palpitations  # Tachycardia  - Troponin, BNP normal 10/11/2023  - Echocardiogram normal 12/8/2023  - TSH 2.35 (12/8/2023); will obtain repeat   - consider EKG if tachycardia is more severe/persistent this admission    # MDD/HELEN  - PTA venlafaxine 150 mg, guanfacine 1 mg daily ordered  - PRN vistaril    # PNC  - Rh positive, Rubella immune, GBS unknown (swab ordered)  - early GCT 98 (10/11/2023) with A1C of 5.1% (8/18/2023); repeat GCT at 26w6d 124 (1/3/2024)  - Varicella non-immune  - CF carrier (living child is also a carrier)  - NIPT low-risk on 11/17/2023  - Other prenatal labs wnl  - Imaging: placenta anterior  - S/p flu vaccine 10/1/2023; Will offer Tdap and other routine vaccines prn  - Pap last UTD  - Contraception: will discuss if delivering  - Feeding: will discuss if delivering     # FWB:   Reactive NST; EFW 82%ile  - Continuous Fetal Monitoring while on magnesium  infusion  - BMZ for fetal lung maturity, s/p dose #1 last evening with second dose ordered  - Magnesium for neuroprotection (currently running also give c/f SI PreE w/ SF, maintain if anticipate imminent delivery)  - NICU for delivery with inpatient consultation if indicated  - Mild polyhydramnios with last MFM US >3 weeks ago; will order repeat scan for this AM    Patient discussed with Dr. Asiya Hurt MD, PGY-3  4:10 AM  [facility] Obstetrics & Gynecology Residency

## 2024-01-17 NOTE — PLAN OF CARE
Data: Patient presented to [facility] at 0205.   Reason for maternal/fetal assessment per patient is Headache and Rule Out Pre-eclampsia  .  Patient is a . Prenatal record reviewed.      OB History    Para Term  AB Living   3 1 1 0 1 1   SAB IAB Ectopic Multiple Live Births   1 0 0 0 1      # Outcome Date GA Lbr Real/2nd Weight Sex Delivery Anes PTL Lv   3 Current            2 SAB      SAB      1 Term      CS-Unspec   MIRELLA   . Medical history:   Past Medical History:   Diagnosis Date    Depressive disorder     Hypertension     Uncomplicated asthma    . Gestational Age 28w6d. VSS. Fetal movement present. Patient denies cramping, vaginal discharge, pelvic pressure, UTI symptoms, GI problems, bloody show, vaginal bleeding, edema, visual disturbances, epigastric or URQ pain, abdominal pain, rupture of membranes. Support persons Renzo present.  Action: Verbal consent for EFM. Triage assessment completed. EFM applied continuously. Uterine assessment and fetal assessment completed, see flowsheet. Presumed adequate fetal oxygenation documented (see flow record).   Response: Dr. Hurt informed of patient arrival. Plan per provider is admit to antepartum, on continuous monitoring, Labs, and continuous Magnesium . Patient verbalized agreement with plan. Patient transferred to room 424 ambulatory, oriented to room and call light. Report given to KARIN Weber.

## 2024-01-17 NOTE — PROVIDER NOTIFICATION
01/17/24 0645 01/17/24 0745 01/17/24 1245   Vital Signs   /56 123/58 102/60     Per Dr Palafox hold 1400 100mg Labetalol dose.

## 2024-01-18 VITALS
HEART RATE: 89 BPM | OXYGEN SATURATION: 97 % | SYSTOLIC BLOOD PRESSURE: 101 MMHG | RESPIRATION RATE: 20 BRPM | TEMPERATURE: 99 F | DIASTOLIC BLOOD PRESSURE: 53 MMHG

## 2024-01-18 LAB
ALBUMIN SERPL BCG-MCNC: 3.5 G/DL (ref 3.5–5.2)
ALP SERPL-CCNC: 76 U/L (ref 40–150)
ALT SERPL W P-5'-P-CCNC: 8 U/L (ref 0–50)
ANION GAP SERPL CALCULATED.3IONS-SCNC: 16 MMOL/L (ref 7–15)
AST SERPL W P-5'-P-CCNC: 11 U/L (ref 0–45)
BILIRUB SERPL-MCNC: <0.2 MG/DL
BUN SERPL-MCNC: 5.9 MG/DL (ref 6–20)
CALCIUM SERPL-MCNC: 9.3 MG/DL (ref 8.6–10)
CHLORIDE SERPL-SCNC: 102 MMOL/L (ref 98–107)
CREAT SERPL-MCNC: 0.54 MG/DL (ref 0.51–0.95)
DEPRECATED HCO3 PLAS-SCNC: 16 MMOL/L (ref 22–29)
EGFRCR SERPLBLD CKD-EPI 2021: >90 ML/MIN/1.73M2
ERYTHROCYTE [DISTWIDTH] IN BLOOD BY AUTOMATED COUNT: 12.6 % (ref 10–15)
GLUCOSE BLDC GLUCOMTR-MCNC: 102 MG/DL (ref 70–99)
GLUCOSE SERPL-MCNC: 169 MG/DL (ref 70–99)
GP B STREP DNA SPEC QL NAA+PROBE: NEGATIVE
HCT VFR BLD AUTO: 33.3 % (ref 35–47)
HGB BLD-MCNC: 11 G/DL (ref 11.7–15.7)
MCH RBC QN AUTO: 30.1 PG (ref 26.5–33)
MCHC RBC AUTO-ENTMCNC: 33 G/DL (ref 31.5–36.5)
MCV RBC AUTO: 91 FL (ref 78–100)
PLATELET # BLD AUTO: 283 10E3/UL (ref 150–450)
POTASSIUM SERPL-SCNC: 3.9 MMOL/L (ref 3.4–5.3)
PROT SERPL-MCNC: 6.4 G/DL (ref 6.4–8.3)
RBC # BLD AUTO: 3.65 10E6/UL (ref 3.8–5.2)
SODIUM SERPL-SCNC: 134 MMOL/L (ref 135–145)
WBC # BLD AUTO: 13.5 10E3/UL (ref 4–11)

## 2024-01-18 PROCEDURE — 250N000013 HC RX MED GY IP 250 OP 250 PS 637

## 2024-01-18 PROCEDURE — 36415 COLL VENOUS BLD VENIPUNCTURE: CPT | Performed by: OBSTETRICS & GYNECOLOGY

## 2024-01-18 PROCEDURE — 59025 FETAL NON-STRESS TEST: CPT | Mod: 26 | Performed by: OBSTETRICS & GYNECOLOGY

## 2024-01-18 PROCEDURE — 99239 HOSP IP/OBS DSCHRG MGMT >30: CPT | Mod: 25 | Performed by: OBSTETRICS & GYNECOLOGY

## 2024-01-18 PROCEDURE — 82247 BILIRUBIN TOTAL: CPT | Performed by: OBSTETRICS & GYNECOLOGY

## 2024-01-18 PROCEDURE — 85014 HEMATOCRIT: CPT | Performed by: OBSTETRICS & GYNECOLOGY

## 2024-01-18 RX ORDER — HYDROXYZINE HYDROCHLORIDE 25 MG/1
25 TABLET, FILM COATED ORAL ONCE
Status: COMPLETED | OUTPATIENT
Start: 2024-01-18 | End: 2024-01-18

## 2024-01-18 RX ORDER — HYDROXYZINE HYDROCHLORIDE 25 MG/1
25 TABLET, FILM COATED ORAL EVERY 6 HOURS PRN
Status: DISCONTINUED | OUTPATIENT
Start: 2024-01-18 | End: 2024-01-18

## 2024-01-18 RX ORDER — HYDROXYZINE HYDROCHLORIDE 50 MG/1
50 TABLET, FILM COATED ORAL ONCE
Status: COMPLETED | OUTPATIENT
Start: 2024-01-18 | End: 2024-01-18

## 2024-01-18 RX ORDER — HYDROXYZINE HYDROCHLORIDE 50 MG/1
50 TABLET, FILM COATED ORAL EVERY 6 HOURS PRN
Status: DISCONTINUED | OUTPATIENT
Start: 2024-01-18 | End: 2024-01-18

## 2024-01-18 RX ADMIN — HYDROXYZINE HYDROCHLORIDE 50 MG: 50 TABLET, FILM COATED ORAL at 03:52

## 2024-01-18 RX ADMIN — LABETALOL HYDROCHLORIDE 100 MG: 100 TABLET, FILM COATED ORAL at 08:46

## 2024-01-18 RX ADMIN — ACETAMINOPHEN 650 MG: 325 TABLET, FILM COATED ORAL at 03:15

## 2024-01-18 ASSESSMENT — ACTIVITIES OF DAILY LIVING (ADL)
ADLS_ACUITY_SCORE: 20

## 2024-01-18 NOTE — PROVIDER NOTIFICATION
01/17/24 1940   Provider Notification   Provider Name/Title Dr Blake   Method of Notification In Department   Request Evaluate - Remote   Notification Reason Other (Comment)     Discussed pt request for meds at 2000 and just 20 minutes of monitoring. MD agreed.  Plan to keep betamethasone admin at 2200

## 2024-01-18 NOTE — PLAN OF CARE
Maternal status stable, VSS. Fetal assessment is as documented. Centricity not working, see paper strip in pt's physical chart.   Pt denies LOF, RUQ pain, contractions, bleeding. Pt reports h/a but declines any interventions at this time.   Patient is coping. Support person Renzo at bedside.

## 2024-01-18 NOTE — PROVIDER NOTIFICATION
01/18/24 0310   Provider Notification   Provider Name/Title Dr Blake   Method of Notification Electronic Page   Request Evaluate - Remote   Notification Reason Other (Comment)     Notified MD that called to room by pt who stated that she had been up since 2 am vitals with anxiety accompanied by chest tightness/pressure and 'fidgetiness'.  Denies chest pain. Checked vitals, all normal and stable. C/o increasing headache 4/10 in temples. Pt requested additional hydroxyzine.    0343 notified MD that anxiety and chest tightness had continued, received verbal order for hydroxyzine.

## 2024-01-18 NOTE — PLAN OF CARE
VSS on room air, afebrile. C/o headache, see flowsheets, responding well to tylenol. Also complaining of hip and back pain, responding moderately to tylenol and well to heat. Monitoring only 20 minutes this shift due to difficulty monitoring, okay per MD. FHR appropriate for gestational age, not deana. Denies blurry vision, epigastric pain. Denies leaking, bleeding. Report given to Rosario FRAZIER.

## 2024-01-18 NOTE — PROVIDER NOTIFICATION
01/18/24 0855   Provider Notification   Provider Name/Title Dr. Braden   Method of Notification In Department   Notification Reason Status Update     Informed Dr. Braden that the patient was having right upper quadrant pain. Dr. Braden assessed the patient and ordered labs to assess.

## 2024-01-18 NOTE — PROVIDER NOTIFICATION
01/17/24 1953   Provider Notification   Provider Name/Title Dr Blake   Method of Notification Electronic Page   Request Evaluate - Remote   Notification Reason Lab/Diagnostic Study     Notified MD that there had been a low sodium this AM, asked if we should do anything to address it.

## 2024-01-18 NOTE — PROGRESS NOTES
Antepartum Progress Note    S: Patient feeling tired today, as she has only been able to get a little sleep here in the hospital. She reports her headache is still bothering her a little, but thinks this is due to lack of sleep and hunger. No vaginal bleeding, contractions, leakage of fluid. Good fetal movement. No SOB, RUQ pain.  She feels ready for discharge, as she has close follow up with her obgyn next week.    O:   Patient Vitals for the past 24 hrs:   BP Temp Temp src Pulse Resp SpO2   24 1215 105/50 98.2  F (36.8  C) Oral 89 20 --   24 0631 109/58 98.3  F (36.8  C) Oral 88 18 --   24 0315 -- 98.3  F (36.8  C) -- -- -- --   24 0305 111/55 -- -- -- 18 --   24 0155 108/51 -- -- -- 16 --   24 2229 103/58 99.5  F (37.5  C) Oral -- 18 --   24 137/63 99.1  F (37.3  C) Oral -- 18 97 %   24 1750 120/65 -- -- -- -- --     Gen: NAD  CV: well perfused  Pulm: non-labored breathing  Abd: Gravid    Weight:   Wt Readings from Last 2 Encounters:   11 116.2 kg (256 lb 3.2 oz) (>99%, Z= 2.53)*     * Growth percentiles are based on CDC (Girls, 2-20 Years) data.       FHT:  baseline 130s, moderate variability, accels present, no decels  TOCO: no ctx in 10 minute period    Labs:   Lab Results   Component Value Date    WBC 13.5 (H) 2024    HGB 11.0 (L) 2024    HCT 33.3 (L) 2024     2024     (L) 2024    POTASSIUM 3.9 2024    CHLORIDE 102 2024    CO2 16 (L) 2024    BUN 5.9 (L) 2024    CR 0.54 2024     (H) 2024    AST 11 2024    ALT 8 2024    ALKPHOS 76 2024    BILITOTAL <0.2 2024        A/P: 33 year old  at 29w0d by 7w1d admitted for evaluation of possible superimposed preeclampsia with severe features after presenting to an outside hospital with severe range blood pressures. Given patient's normal BP while inpatient, HELLP labs, and UPC, clinical picture more  suggestive of chronic hypertension exacerbation. Patient reports taking less pta labetalol than prescribed, she will discharge today on labetalol 100 BID.    # Chronic hypertension  # R/o Superimposed preeclampsia with severe features  - Serial BP monitoring  - BPs: normotensive on arrival to [facility]  - Antihypertensives:               - PTA labetalol 100 BID> D#1 100 TID > D#1 100 BID  - IV antihypertensives PRN for sustained severe range blood pressures  - Labs: HELLP labs, UPC                 - AST was mildly elevated to 81 with NOB labs on 8/18/2023, subsequently normal at 25 on re-check x2                 - baseline labs otherwise normal  - Daily weights, strict I&Os  - s/p 12 hours mag sulfate infusions   - Continue PTA ASA     # Palpitations  # Tachycardia  - Troponin, BNP normal 10/11/2023  - Echocardiogram normal 12/8/2023  - TSH 2.35 (12/8/2023); will obtain repeat   - consider EKG if tachycardia is more severe/persistent this admission     # MDD/HELEN  - PTA venlafaxine 150 mg, guanfacine 1 mg daily ordered  - PRN vistaril     # PNC  - Rh positive, Rubella immune, GBS unknown (swab ordered)  - early GCT 98 (10/11/2023) with A1C of 5.1% (8/18/2023); repeat GCT at 26w6d 124 (1/3/2024)  - Varicella non-immune  - CF carrier (living child is also a carrier)  - NIPT low-risk on 11/17/2023  - Other prenatal labs wnl  - Imaging: placenta anterior  - S/p flu vaccine 10/1/2023; Will offer Tdap and other routine vaccines prn  - Pap last UTD                   # FWB:   Reactive NST; EFW 82%ile  - Continuous Fetal Monitoring while on magnesium infusion  - s/p BMZ for fetal lung maturity (1/16-17)  - s/p 12 hours of magnesium sulfate infusion of neuroprotection  - NICU for delivery with inpatient consultation if indicated  - Mild polyhydramnios with last MFM US >3 weeks ago; will order repeat scan for this AM    Discharge: today    Patient seen and discussed with Dr. Braden.     Ghassan Beth, MS4  [facility]  [place] [facility]    Nallely Edwards MD  OB/GYN Resident PGY-4  6:50 PM January 18, 2024

## 2024-01-19 NOTE — PROGRESS NOTES
Patient's After Visit Summary was reviewed with patient and spouse.   Patient verbalized understanding of After Visit Summary, recommended follow up and was given an opportunity to ask questions. Pt denies having any questions or concerns.   Discharge medications sent home with patient/family: Not applicable   Discharged via W/C (per pt request) to hospital entrance with spouse. Pt off unit at 1940.

## 2024-01-19 NOTE — PLAN OF CARE
Patient's vitals stable and afebrile. The patient denies decrease in fetal movement, vaginal bleeding, LOF, headache, and vision changes. The patient had upper gastric pain this morning and ALT/AST labs were WNL. After the lab results were reviewed with the patient, she stated that the pain had gone away. The labs showed a BG of 169 and the patient was in the middle of eating breakfast that was Latvian toast with syrup, frappuccino, and orange juice. The RN checked a BG that was 102. The patient had afternoon fetal heart monitoring that showed that the baby had a prolonged deceleration and the provider ordered another 2 hours of monitoring. Within a hour of the monitoring there was another un determinate deceleration. RN was at bedside holding on the monitor for the whole time the patient was being monitored. With the last hour and half of monitoring there were no decelerations, accelerations, and moderate variability. Therefore, the providers ordered that she can off the monitor and that they will discharge the patient.

## 2024-01-26 ENCOUNTER — TRANSFERRED RECORDS (OUTPATIENT)
Dept: HEALTH INFORMATION MANAGEMENT | Facility: CLINIC | Age: 34
End: 2024-01-26

## 2024-02-23 ENCOUNTER — TRANSFERRED RECORDS (OUTPATIENT)
Dept: HEALTH INFORMATION MANAGEMENT | Facility: CLINIC | Age: 34
End: 2024-02-23
Payer: COMMERCIAL

## 2024-03-01 ENCOUNTER — TRANSFERRED RECORDS (OUTPATIENT)
Dept: HEALTH INFORMATION MANAGEMENT | Facility: CLINIC | Age: 34
End: 2024-03-01
Payer: COMMERCIAL

## 2024-03-01 DIAGNOSIS — O40.9XX0 POLYHYDRAMNIOS AFFECTING PREGNANCY: ICD-10-CM

## 2024-03-01 DIAGNOSIS — O10.919 CHRONIC HYPERTENSION IN PREGNANCY: Primary | ICD-10-CM

## 2024-03-01 NOTE — PROGRESS NOTES
Received call from Dr. Juarez. [facility] called her asking for RL2 with Transfer of care for Polyhydramnios. They also want C/S at 37 weeks.   Writer called [facility] requesting a formal referral, they will be sending.  Spot on hold on 3/14 in L&D OR for c/s.  Namrata Lucas RN

## 2024-03-04 ENCOUNTER — MEDICAL CORRESPONDENCE (OUTPATIENT)
Dept: HEALTH INFORMATION MANAGEMENT | Facility: CLINIC | Age: 34
End: 2024-03-04
Payer: COMMERCIAL

## 2024-03-04 ENCOUNTER — TRANSCRIBE ORDERS (OUTPATIENT)
Dept: MATERNAL FETAL MEDICINE | Facility: CLINIC | Age: 34
End: 2024-03-04
Payer: COMMERCIAL

## 2024-03-04 DIAGNOSIS — O26.90 PREGNANCY RELATED CONDITION, ANTEPARTUM: Primary | ICD-10-CM

## 2024-03-05 DIAGNOSIS — Z98.891 HX OF CESAREAN SECTION: Primary | ICD-10-CM

## 2024-03-06 ENCOUNTER — OFFICE VISIT (OUTPATIENT)
Dept: MATERNAL FETAL MEDICINE | Facility: CLINIC | Age: 34
End: 2024-03-06
Attending: OBSTETRICS & GYNECOLOGY
Payer: COMMERCIAL

## 2024-03-06 ENCOUNTER — OFFICE VISIT (OUTPATIENT)
Dept: MATERNAL FETAL MEDICINE | Facility: CLINIC | Age: 34
End: 2024-03-06
Attending: ADVANCED PRACTICE MIDWIFE
Payer: COMMERCIAL

## 2024-03-06 ENCOUNTER — HOSPITAL ENCOUNTER (OUTPATIENT)
Dept: ULTRASOUND IMAGING | Facility: CLINIC | Age: 34
Discharge: HOME | End: 2024-03-06
Attending: OBSTETRICS & GYNECOLOGY
Payer: COMMERCIAL

## 2024-03-06 ENCOUNTER — PREP FOR PROCEDURE (OUTPATIENT)
Dept: MATERNAL FETAL MEDICINE | Facility: CLINIC | Age: 34
End: 2024-03-06

## 2024-03-06 VITALS
WEIGHT: 293 LBS | HEART RATE: 130 BPM | SYSTOLIC BLOOD PRESSURE: 123 MMHG | RESPIRATION RATE: 18 BRPM | OXYGEN SATURATION: 97 % | DIASTOLIC BLOOD PRESSURE: 82 MMHG

## 2024-03-06 DIAGNOSIS — O09.93 SUPERVISION OF HIGH RISK PREGNANCY IN THIRD TRIMESTER: Primary | ICD-10-CM

## 2024-03-06 DIAGNOSIS — O40.9XX0 POLYHYDRAMNIOS AFFECTING PREGNANCY: Primary | ICD-10-CM

## 2024-03-06 DIAGNOSIS — O26.90 PREGNANCY RELATED CONDITION, ANTEPARTUM: ICD-10-CM

## 2024-03-06 DIAGNOSIS — O40.3XX0 POLYHYDRAMNIOS AFFECTING PREGNANCY IN THIRD TRIMESTER: ICD-10-CM

## 2024-03-06 PROCEDURE — 87653 STREP B DNA AMP PROBE: CPT | Performed by: ADVANCED PRACTICE MIDWIFE

## 2024-03-06 PROCEDURE — 76816 OB US FOLLOW-UP PER FETUS: CPT

## 2024-03-06 PROCEDURE — 76819 FETAL BIOPHYS PROFIL W/O NST: CPT

## 2024-03-06 PROCEDURE — 99215 OFFICE O/P EST HI 40 MIN: CPT | Mod: 25 | Performed by: ADVANCED PRACTICE MIDWIFE

## 2024-03-06 PROCEDURE — 76819 FETAL BIOPHYS PROFIL W/O NST: CPT | Mod: 26 | Performed by: OBSTETRICS & GYNECOLOGY

## 2024-03-06 PROCEDURE — 76816 OB US FOLLOW-UP PER FETUS: CPT | Mod: 26 | Performed by: OBSTETRICS & GYNECOLOGY

## 2024-03-06 ASSESSMENT — PATIENT HEALTH QUESTIONNAIRE - PHQ9: SUM OF ALL RESPONSES TO PHQ QUESTIONS 1-9: 21

## 2024-03-06 NOTE — PROGRESS NOTES
Pt seen in clinic today for RL2, BPP, and [facility] OB visit at 35w6d gestation due to pregnancy c/b CHTN, maternal tachycardia, severe polyhydramnios (see report/notes). VSS. Pulse 130s. Pt has been worked up for tachycardia with echo and zio patch. Pt denies bldg/lof/change in discharge/contractions/headache/vision changes/chest pain. Pt does note SOB and edema in lower extremities. Pt breathing without difficulty. Dr. Henderson reviewed US. Namita Escalante CNM met with pt and discussed POC. Plan c/s with tubal at [place] on 3/14. C/s packet, soap, and drink given to patient. Pt discharged stable and ambulatory.     Madonna Jimenez RN

## 2024-03-06 NOTE — PROGRESS NOTES
"Maternal fetal Medicine OB Follow up visit.     Irina Alcantara  : 1990  MRN: [id number]    CC: OB Transfer of care    Subjective:  Irina Alcantara is a 33 year old  at 35w6d presenting for routine OB follow-up. Today, she is feeling well overall, but very ready to deliver her baby next week. Continues to have ongoing musculoskeletal discomforts of pregnancy. Sore back, hips, legs. Having some SOB with activity given the extra fluid.     Patient denies regular, painful contractions, denies loss of fluid or vaginal bleeding. Reports fetal movement.      Her blood pressure is well-controlled with 100mg BID of labetalol. She denies HA, vision changes, or other s/s of preeclampsia.     Her health history is significant for exercise-induced asthma, but she has not needed to use her inhaler during this pregnancy. She also has a history of migraines, GERD, and anxiety/depression.    Her mood is manageable at this time. She reports that she often scores high on screening tools (PHQ-9 score of 21 today), but her goal is not to reduce that number, but to feel that she has the appropriate tools to manage her anxiety and depression. She meets with her therapist twice a month and is taking her prescribed meds without issue.     She is anxious about her upcoming surgery as she did not feel heard with her last birth. She reports that her labor epidural was not working well while she was in the OR, but that staff proceeded with delivery regardless. She also is not certain on details, but does believe after delivery she required \"a balloon in my uterus\", but did not receive a blood transfusion.    OB Hx:  OB History    Para Term  AB Living   3 1 1 0 1 1   SAB IAB Ectopic Multiple Live Births   1 0 0 0 1      # Outcome Date GA Lbr Real/2nd Weight Sex Delivery Anes PTL Lv   3 Current            2 SAB      SAB      1 Term      CS-Unspec   MIRELLA     Past Medical History:   Diagnosis Date    Depressive " "disorder     HELEN (generalized anxiety disorder)     Hypertension     Migraine     Uncomplicated asthma     exercise induced     Past Surgical History:   Procedure Laterality Date    GALLBLADDER SURGERY  2019         Objective:  /82 (BP Location: Left arm, Patient Position: Chair, Cuff Size: Adult Large)   Pulse (!) 130   Resp 18   Wt 134.7 kg (297 lb)   LMP 06/15/2023 (Approximate)   SpO2 97%   Gen: alert, oriented, NAD  Skin: warm, dry, intact  Respiratory: breathing unlabored, no SOB at rest  Abdominal: gravid, non-tender  Pelvic: deferred  Extremities: trace to +1 edema.  Psych: mood WNL, behavior WNL      OB Ultrasound:  Please see \"imaging\" tab under chart review for today's ultrasound results.      Assessment/Plan:  33 year old  at 35w6d here for follow up OB visit.    Pregnancy has been complicated by:   Maternal dx:  - Anxiety/depression  - cHTN  - Tachycardia  - Migraines   - GERD  - Exercise-induced asthma  - Former smoker in early pregnancy    Fetal dx:  - Severe poly      Transfer of care:  - Oriented patient to [facility] practice. Reviewed that [facility] is a multidisciplinary institution and her care will be collaborative in fashion with [facility] doctors, CNM, residents, fellows, and [facility] clinic for intrapartum management.     Anxiety/depression:  - Continue with previously prescribed venlafaxine and guanfacine from psychiatrist.  - Follows closely with therapist twice a month.  - Elevated PHQ-9 score of 21, but states mood is stable and manageable at this time.   - Reviewed that during her upcoming c/s, she can communicate with anesthesia about possibly requesting additional anxiolytics if needed. Offered virtual visit with anesthesia to discuss her concerns and past experience, but patient would prefer to just review this with the team at the time of delivery.     Tachycardia:  - s/p Ziopatch x2 weeks: min heart rate of 63 bpm, max of 180 bpm, average 95 bpm. No abnormalities noted in " preliminary reading.  - Normal TSH of 2.31 on 12/8    cHTN:  - Hospitalized from 1/16-17 for RADHA work up. Work up negative and worsening BP determined to be due to only taking prescribed labetalol daily vs BID. Underwent magnesium sulfate and betamethasone administration at that time.   - BP normotensive. Continue with Labetalol 100mg BID.   - Taking 162mg aspirin daily.   - Offer enrollment in [place]-BP program on discharge from the hospital.     GERD:  - Well-controlled with Omeprazole 20mg BID.    Asthma:  - Stable and not requiring use of albuterol during this pregnancy.     Tobacco use in early pregnancy:  - Patient quit in the first trimester shortly after learning she was pregnant.     Routine PNC:  - Prenatal labs:  Rh: +  antibody: neg   HepB/HIV/RPR/HepC: nonreactive   GC/CT: negative   Rubella: immune     GCT: pass  - Pap smear: NIL and HPV neg (8/2023)  - Immunizations:  s/p TDap 1/26/24  - Genetic screening: low-risk NIPT  - GBS pending    Delivery planning:  - Repeat c/s with salpingectomy scheduled for 3/14. Due to distance from hospital, patient will not have pre-op labs drawn before surgery and these will be collected morning of her procedure.   - Patient takes medications at night, and instructed to take all but withhold ASA the night before surgery.  - Feeding: breastfeeding  - Contraception plans: bilateral salpingectomy. Patient has private insurance so can be consented at the time of surgery.        45 minutes spent on the date of the encounter, doing chart review, history and exam, documentation and further activities as noted.      Namita Escalante CNM on 3/6/2024 at 8:39 AM

## 2024-03-06 NOTE — PROGRESS NOTES
The patient was seen for an ultrasound in the Maternal[facility]Fetal Medicine Center at the [facility] today.  For a detailed report of the ultrasound examination, please see the ultrasound report which can be found under the imaging tab.    If you have questions regarding today's evaluation or if we can be of further service, please contact the Maternal-Fetal Medicine Center.    Soledad Henderson MD  , OB/GYN  Maternal-Fetal Medicine  [phone] (Pager)

## 2024-03-07 LAB — GP B STREP DNA SPEC QL NAA+PROBE: NEGATIVE

## 2024-03-13 ENCOUNTER — ANESTHESIA EVENT (OUTPATIENT)
Dept: OBGYN | Facility: CLINIC | Age: 34
End: 2024-03-13
Payer: COMMERCIAL

## 2024-03-13 ASSESSMENT — LIFESTYLE VARIABLES: TOBACCO_USE: 1

## 2024-03-13 NOTE — ANESTHESIA PREPROCEDURE EVALUATION
Anesthesia Pre-Procedure Evaluation    Patient: Irina Alcantara   MRN: [id number] : 1990        Procedure : Procedure(s):   section, tubal ligation, combined          Past Medical History:   Diagnosis Date    Depressive disorder     HELEN (generalized anxiety disorder)     Hypertension     Migraine     Uncomplicated asthma     exercise induced      Past Surgical History:   Procedure Laterality Date    GALLBLADDER SURGERY  2019      Allergies   Allergen Reactions    Trazodone Itching      Social History     Tobacco Use    Smoking status: Some Days     Types: Cigarettes    Smokeless tobacco: Never   Substance Use Topics    Alcohol use: No      Wt Readings from Last 1 Encounters:   24 134.7 kg (297 lb)        Anesthesia Evaluation   Pt has had prior anesthetic. Type: General and Regional.    No history of anesthetic complications       ROS/MED HX  ENT/Pulmonary:     (+)     TANIA risk factors, snores loudly, hypertension, obese, daytime somnolence,       tobacco use (quit in 1st trimester),     Intermittent, asthma (exercise induced) Last exacerbation: last inhaler use months ago,  Treatment: Inhaler prn,              (-) recent URI   Neurologic:     (+)      migraines,                       (-) no seizures and no CVA   Cardiovascular: Comment: Ziopatch 24 - 24 (ordered for tachycardia)--> Minimum HR 63, max , avg HR 95. Predominant underlying rhythm sinus rhythm. Isolated SVEs rare (<1%), no SVE couplets or triplets and isolated VEs were rare (<1%)    (+)  hypertension- -   -  - -           DELACRUZ.               Irregular Heartbeat/Palpitations,       Previous cardiac testing   Echo: Date: 2023 Results:  Indication for study: Tachycardia   Cardiac Rhythm: Sinus tachycardia.Study quality: Technically limited.   Imaging limitations: This study was subject to imaging limitations due to body habitus and a prominent lung artifact.     Final Impressions:    1. Technically limited exam.     2. Normal LV size, normal wall thickness, normal function with an estimated EF of 60 - 65%.    3. Right ventricular cavity size is normal, global systolic RV function is normal.    4. No significant valve disease detected.    5. Normal estimated CVP.     Stress Test:  Date: Results:    ECG Reviewed:  Date: Results:    Cath:  Date: Results:   (-) murmur   METS/Exercise Tolerance: 3 - Able to walk 1-2 blocks without stopping    Hematologic:  - neg hematologic  ROS     Musculoskeletal:  - neg musculoskeletal ROS     GI/Hepatic:     (+) GERD, Asymptomatic on medication,                  Renal/Genitourinary:  - neg Renal ROS     Endo:  - neg endo ROS   (+)               Obesity (BMI 47),       Psychiatric/Substance Use:     (+) psychiatric history anxiety and depression       Infectious Disease:  - neg infectious disease ROS     Malignancy:  - neg malignancy ROS     Other: Comment:  scheduled for CS at 37w 2/2 severe polyhydramnios. Currently being seen by MFM. Hx of prior CS. 2024 patient was admitted d/t concerns for superimposed pre-eclampsia but found to have exacerbated chronic HTN d/t taking her BP medications daily instead of BID.            Physical Exam    Airway      Comment: Large neck circumfrence    Mallampati: III   TM distance: > 3 FB   Neck ROM: full   Mouth opening: > 3 cm    Respiratory Devices and Support         Dental       (+) Modest Abnormalities - crowns, retainers, 1 or 2 missing teeth      Cardiovascular          Rhythm and rate: regular and tachycardia   (+) peripheral edema   (-) no murmur    Pulmonary   pulmonary exam normal        breath sounds clear to auscultation           OUTSIDE LABS:  CBC:   Lab Results   Component Value Date    WBC 13.5 (H) 2024    WBC 16.7 (H) 2024    HGB 11.0 (L) 2024    HGB 11.9 2024    HCT 33.3 (L) 2024    HCT 36.2 2024     2024     2024     BMP:   Lab Results   Component Value Date     " (L) 2024     (L) 2024    POTASSIUM 3.9 2024    POTASSIUM 4.3 2024    CHLORIDE 102 2024    CHLORIDE 101 2024    CO2 16 (L) 2024    CO2 20 (L) 2024    BUN 5.9 (L) 2024    BUN 5.4 (L) 2024    CR 0.54 2024    CR 0.55 2024     (H) 2024     (H) 2024     COAGS: No results found for: \"PTT\", \"INR\", \"FIBR\"  POC:   Lab Results   Component Value Date    HCG Negative 2011     HEPATIC:   Lab Results   Component Value Date    ALBUMIN 3.5 2024    PROTTOTAL 6.4 2024    ALT 8 2024    AST 11 2024    ALKPHOS 76 2024    BILITOTAL <0.2 2024     OTHER:   Lab Results   Component Value Date    ANTONY 9.3 2024    TSH 1.34 2024       Anesthesia Plan    ASA Status:  3    NPO Status:  NPO Appropriate    Anesthesia Type: Spinal.   Induction: N/a.   Maintenance: N/A.   Techniques and Equipment:       - Blood: T&S     - Drips/Meds: Phenylephrine     Consents    Anesthesia Plan(s) and associated risks, benefits, and realistic alternatives discussed. Questions answered and patient/representative(s) expressed understanding.     - Discussed: Risks, Benefits and Alternatives for BOTH SEDATION and the PROCEDURE were discussed     - Discussed with:  Patient       - Patient is DNR/DNI Status: No     Use of blood products discussed: Yes.     - Discussed with: Patient.     - Consented: consented to blood products     Postoperative Care    Pain management: Multi-modal analgesia, IV analgesics, Oral pain medications, intrathecal morphine, Peripheral nerve block (Single Shot).   PONV prophylaxis: Ondansetron (or other 5HT-3), Dexamethasone or Solumedrol     Comments:    Other Comments: 34 yo female  @ 37w0d PMHx cHTN on labetolol, GERD, intermittent asthma (last inhaler use months ago), obesity (BMI 47), tachycardia (workup negative), anxiety, depression, migraines presenting for repeat "  delivery and tubal ligation due to severe polyhydramnios, previous c/s x 1. Plan spinal with standard ASA monitors. Of note, patient has requested medication for anxiety prior to going back to the operating room. Her HR this morning has been between 110-125 prior to the operating room, and bedside POCUS demonstrating grossly unchanged echo since previous in 2023, technically challenging windows. We discussed the risks and benefits of neuraxial anesthesia including, but not limited to: spinal headache, infection, bleeding, damage to surrounding structures, failed or patchy neuraxial requiring replacement or conversion to general anesthesia in an emergent setting. Irina Alacntara verbalized understanding of these risks. All questions were answered. MD JOHANNY Mejia MD    I have reviewed the pertinent notes and labs in the chart from the past 30 days and (re)examined the patient.  Any updates or changes from those notes are reflected in this note.

## 2024-03-14 ENCOUNTER — ANESTHESIA (OUTPATIENT)
Dept: OBGYN | Facility: CLINIC | Age: 34
End: 2024-03-14
Payer: COMMERCIAL

## 2024-03-14 ENCOUNTER — HOSPITAL ENCOUNTER (INPATIENT)
Facility: CLINIC | Age: 34
LOS: 2 days | Discharge: HOME HEALTH | End: 2024-03-16
Attending: OBSTETRICS & GYNECOLOGY | Admitting: OBSTETRICS & GYNECOLOGY
Payer: COMMERCIAL

## 2024-03-14 DIAGNOSIS — Z13.6 CARDIOVASCULAR SCREENING; LDL GOAL LESS THAN 160: ICD-10-CM

## 2024-03-14 DIAGNOSIS — Z98.891 S/P CESAREAN SECTION: ICD-10-CM

## 2024-03-14 DIAGNOSIS — F32.A DEPRESSION, UNSPECIFIED DEPRESSION TYPE: ICD-10-CM

## 2024-03-14 DIAGNOSIS — K21.9 GASTROESOPHAGEAL REFLUX DISEASE WITHOUT ESOPHAGITIS: ICD-10-CM

## 2024-03-14 DIAGNOSIS — O10.919 CHRONIC HYPERTENSION IN PREGNANCY: Primary | ICD-10-CM

## 2024-03-14 DIAGNOSIS — J45.20 MILD INTERMITTENT ASTHMA, UNSPECIFIED WHETHER COMPLICATED: ICD-10-CM

## 2024-03-14 LAB
ABO/RH(D): NORMAL
ANTIBODY SCREEN: NEGATIVE
CREAT SERPL-MCNC: 0.71 MG/DL (ref 0.51–0.95)
EGFRCR SERPLBLD CKD-EPI 2021: >90 ML/MIN/1.73M2
ERYTHROCYTE [DISTWIDTH] IN BLOOD BY AUTOMATED COUNT: 13.9 % (ref 10–15)
HCT VFR BLD AUTO: 34.6 % (ref 35–47)
HGB BLD-MCNC: 11.3 G/DL (ref 11.7–15.7)
MCH RBC QN AUTO: 27 PG (ref 26.5–33)
MCHC RBC AUTO-ENTMCNC: 32.7 G/DL (ref 31.5–36.5)
MCV RBC AUTO: 83 FL (ref 78–100)
PLATELET # BLD AUTO: 290 10E3/UL (ref 150–450)
RBC # BLD AUTO: 4.18 10E6/UL (ref 3.8–5.2)
SPECIMEN EXPIRATION DATE: NORMAL
T PALLIDUM AB SER QL: NONREACTIVE
WBC # BLD AUTO: 12.5 10E3/UL (ref 4–11)

## 2024-03-14 PROCEDURE — 258N000003 HC RX IP 258 OP 636: Performed by: STUDENT IN AN ORGANIZED HEALTH CARE EDUCATION/TRAINING PROGRAM

## 2024-03-14 PROCEDURE — 370N000017 HC ANESTHESIA TECHNICAL FEE, PER MIN: Performed by: OBSTETRICS & GYNECOLOGY

## 2024-03-14 PROCEDURE — 250N000013 HC RX MED GY IP 250 OP 250 PS 637: Performed by: OBSTETRICS & GYNECOLOGY

## 2024-03-14 PROCEDURE — 250N000011 HC RX IP 250 OP 636: Performed by: STUDENT IN AN ORGANIZED HEALTH CARE EDUCATION/TRAINING PROGRAM

## 2024-03-14 PROCEDURE — 36415 COLL VENOUS BLD VENIPUNCTURE: CPT | Performed by: STUDENT IN AN ORGANIZED HEALTH CARE EDUCATION/TRAINING PROGRAM

## 2024-03-14 PROCEDURE — 82565 ASSAY OF CREATININE: CPT | Performed by: STUDENT IN AN ORGANIZED HEALTH CARE EDUCATION/TRAINING PROGRAM

## 2024-03-14 PROCEDURE — 250N000013 HC RX MED GY IP 250 OP 250 PS 637: Performed by: STUDENT IN AN ORGANIZED HEALTH CARE EDUCATION/TRAINING PROGRAM

## 2024-03-14 PROCEDURE — 999N000141 HC STATISTIC PRE-PROCEDURE NURSING ASSESSMENT: Performed by: OBSTETRICS & GYNECOLOGY

## 2024-03-14 PROCEDURE — 88341 IMHCHEM/IMCYTCHM EA ADD ANTB: CPT | Mod: 26 | Performed by: PATHOLOGY

## 2024-03-14 PROCEDURE — 59514 CESAREAN DELIVERY ONLY: CPT | Mod: GC | Performed by: OBSTETRICS & GYNECOLOGY

## 2024-03-14 PROCEDURE — 250N000011 HC RX IP 250 OP 636: Mod: JZ | Performed by: OBSTETRICS & GYNECOLOGY

## 2024-03-14 PROCEDURE — 86900 BLOOD TYPING SEROLOGIC ABO: CPT | Performed by: STUDENT IN AN ORGANIZED HEALTH CARE EDUCATION/TRAINING PROGRAM

## 2024-03-14 PROCEDURE — 88342 IMHCHEM/IMCYTCHM 1ST ANTB: CPT | Mod: 26 | Performed by: PATHOLOGY

## 2024-03-14 PROCEDURE — 85027 COMPLETE CBC AUTOMATED: CPT | Performed by: STUDENT IN AN ORGANIZED HEALTH CARE EDUCATION/TRAINING PROGRAM

## 2024-03-14 PROCEDURE — 88342 IMHCHEM/IMCYTCHM 1ST ANTB: CPT | Mod: TC | Performed by: OBSTETRICS & GYNECOLOGY

## 2024-03-14 PROCEDURE — 250N000009 HC RX 250: Performed by: STUDENT IN AN ORGANIZED HEALTH CARE EDUCATION/TRAINING PROGRAM

## 2024-03-14 PROCEDURE — 710N000010 HC RECOVERY PHASE 1, LEVEL 2, PER MIN: Performed by: OBSTETRICS & GYNECOLOGY

## 2024-03-14 PROCEDURE — 360N000076 HC SURGERY LEVEL 3, PER MIN: Performed by: OBSTETRICS & GYNECOLOGY

## 2024-03-14 PROCEDURE — 0UT70ZZ RESECTION OF BILATERAL FALLOPIAN TUBES, OPEN APPROACH: ICD-10-PCS | Performed by: OBSTETRICS & GYNECOLOGY

## 2024-03-14 PROCEDURE — 271N000001 HC OR GENERAL SUPPLY NON-STERILE: Performed by: OBSTETRICS & GYNECOLOGY

## 2024-03-14 PROCEDURE — C9290 INJ, BUPIVACAINE LIPOSOME: HCPCS | Performed by: STUDENT IN AN ORGANIZED HEALTH CARE EDUCATION/TRAINING PROGRAM

## 2024-03-14 PROCEDURE — 272N000001 HC OR GENERAL SUPPLY STERILE: Performed by: OBSTETRICS & GYNECOLOGY

## 2024-03-14 PROCEDURE — 59514 CESAREAN DELIVERY ONLY: CPT | Performed by: STUDENT IN AN ORGANIZED HEALTH CARE EDUCATION/TRAINING PROGRAM

## 2024-03-14 PROCEDURE — 120N000002 HC R&B MED SURG/OB UMMC

## 2024-03-14 PROCEDURE — 58611 LIGATE OVIDUCT(S) ADD-ON: CPT | Mod: GC | Performed by: OBSTETRICS & GYNECOLOGY

## 2024-03-14 PROCEDURE — 88302 TISSUE EXAM BY PATHOLOGIST: CPT | Mod: 26 | Performed by: PATHOLOGY

## 2024-03-14 PROCEDURE — 86780 TREPONEMA PALLIDUM: CPT | Performed by: STUDENT IN AN ORGANIZED HEALTH CARE EDUCATION/TRAINING PROGRAM

## 2024-03-14 RX ORDER — ONDANSETRON 4 MG/1
4 TABLET, ORALLY DISINTEGRATING ORAL EVERY 6 HOURS PRN
Status: DISCONTINUED | OUTPATIENT
Start: 2024-03-14 | End: 2024-03-16 | Stop reason: HOSPADM

## 2024-03-14 RX ORDER — ENOXAPARIN SODIUM 100 MG/ML
40 INJECTION SUBCUTANEOUS EVERY 12 HOURS
Status: DISCONTINUED | OUTPATIENT
Start: 2024-03-15 | End: 2024-03-16 | Stop reason: HOSPADM

## 2024-03-14 RX ORDER — CITRIC ACID/SODIUM CITRATE 334-500MG
SOLUTION, ORAL ORAL PRN
Status: DISCONTINUED | OUTPATIENT
Start: 2024-03-14 | End: 2024-03-14

## 2024-03-14 RX ORDER — NALOXONE HYDROCHLORIDE 0.4 MG/ML
0.4 INJECTION, SOLUTION INTRAMUSCULAR; INTRAVENOUS; SUBCUTANEOUS
Status: DISCONTINUED | OUTPATIENT
Start: 2024-03-14 | End: 2024-03-14 | Stop reason: HOSPADM

## 2024-03-14 RX ORDER — BISACODYL 10 MG
10 SUPPOSITORY, RECTAL RECTAL DAILY PRN
Status: DISCONTINUED | OUTPATIENT
Start: 2024-03-16 | End: 2024-03-16 | Stop reason: HOSPADM

## 2024-03-14 RX ORDER — OXYTOCIN/0.9 % SODIUM CHLORIDE 30/500 ML
PLASTIC BAG, INJECTION (ML) INTRAVENOUS CONTINUOUS PRN
Status: DISCONTINUED | OUTPATIENT
Start: 2024-03-14 | End: 2024-03-14

## 2024-03-14 RX ORDER — CITRIC ACID/SODIUM CITRATE 334-500MG
30 SOLUTION, ORAL ORAL
Status: DISCONTINUED | OUTPATIENT
Start: 2024-03-14 | End: 2024-03-14 | Stop reason: HOSPADM

## 2024-03-14 RX ORDER — CITRIC ACID/SODIUM CITRATE 334-500MG
30 SOLUTION, ORAL ORAL ONCE
Status: COMPLETED | OUTPATIENT
Start: 2024-03-14 | End: 2024-03-14

## 2024-03-14 RX ORDER — FENTANYL CITRATE 50 UG/ML
INJECTION, SOLUTION INTRAMUSCULAR; INTRAVENOUS
Status: DISCONTINUED | OUTPATIENT
Start: 2024-03-14 | End: 2024-03-14

## 2024-03-14 RX ORDER — LOPERAMIDE HCL 2 MG
4 CAPSULE ORAL
Status: DISCONTINUED | OUTPATIENT
Start: 2024-03-14 | End: 2024-03-14 | Stop reason: HOSPADM

## 2024-03-14 RX ORDER — OXYTOCIN 10 [USP'U]/ML
10 INJECTION, SOLUTION INTRAMUSCULAR; INTRAVENOUS
Status: DISCONTINUED | OUTPATIENT
Start: 2024-03-14 | End: 2024-03-16 | Stop reason: HOSPADM

## 2024-03-14 RX ORDER — LOPERAMIDE HCL 2 MG
2 CAPSULE ORAL
Status: DISCONTINUED | OUTPATIENT
Start: 2024-03-14 | End: 2024-03-16 | Stop reason: HOSPADM

## 2024-03-14 RX ORDER — NALOXONE HYDROCHLORIDE 0.4 MG/ML
0.4 INJECTION, SOLUTION INTRAMUSCULAR; INTRAVENOUS; SUBCUTANEOUS
Status: DISCONTINUED | OUTPATIENT
Start: 2024-03-14 | End: 2024-03-14

## 2024-03-14 RX ORDER — MODIFIED LANOLIN
OINTMENT (GRAM) TOPICAL
Status: DISCONTINUED | OUTPATIENT
Start: 2024-03-14 | End: 2024-03-16 | Stop reason: HOSPADM

## 2024-03-14 RX ORDER — LOPERAMIDE HCL 2 MG
4 CAPSULE ORAL
Status: DISCONTINUED | OUTPATIENT
Start: 2024-03-14 | End: 2024-03-16 | Stop reason: HOSPADM

## 2024-03-14 RX ORDER — NALOXONE HYDROCHLORIDE 0.4 MG/ML
0.2 INJECTION, SOLUTION INTRAMUSCULAR; INTRAVENOUS; SUBCUTANEOUS
Status: DISCONTINUED | OUTPATIENT
Start: 2024-03-14 | End: 2024-03-16 | Stop reason: HOSPADM

## 2024-03-14 RX ORDER — TRANEXAMIC ACID 10 MG/ML
1 INJECTION, SOLUTION INTRAVENOUS EVERY 30 MIN PRN
Status: DISCONTINUED | OUTPATIENT
Start: 2024-03-14 | End: 2024-03-16 | Stop reason: HOSPADM

## 2024-03-14 RX ORDER — SODIUM CHLORIDE, SODIUM LACTATE, POTASSIUM CHLORIDE, CALCIUM CHLORIDE 600; 310; 30; 20 MG/100ML; MG/100ML; MG/100ML; MG/100ML
INJECTION, SOLUTION INTRAVENOUS CONTINUOUS
Status: DISCONTINUED | OUTPATIENT
Start: 2024-03-14 | End: 2024-03-14 | Stop reason: HOSPADM

## 2024-03-14 RX ORDER — DEXTROSE, SODIUM CHLORIDE, SODIUM LACTATE, POTASSIUM CHLORIDE, AND CALCIUM CHLORIDE 5; .6; .31; .03; .02 G/100ML; G/100ML; G/100ML; G/100ML; G/100ML
INJECTION, SOLUTION INTRAVENOUS CONTINUOUS
Status: DISCONTINUED | OUTPATIENT
Start: 2024-03-14 | End: 2024-03-16 | Stop reason: HOSPADM

## 2024-03-14 RX ORDER — MISOPROSTOL 200 UG/1
400 TABLET ORAL
Status: DISCONTINUED | OUTPATIENT
Start: 2024-03-14 | End: 2024-03-16 | Stop reason: HOSPADM

## 2024-03-14 RX ORDER — HYDROCORTISONE 25 MG/G
CREAM TOPICAL 3 TIMES DAILY PRN
Status: DISCONTINUED | OUTPATIENT
Start: 2024-03-14 | End: 2024-03-16 | Stop reason: HOSPADM

## 2024-03-14 RX ORDER — EPHEDRINE SULFATE 50 MG/ML
INJECTION, SOLUTION INTRAVENOUS PRN
Status: DISCONTINUED | OUTPATIENT
Start: 2024-03-14 | End: 2024-03-14

## 2024-03-14 RX ORDER — EPHEDRINE SULFATE 50 MG/ML
INJECTION, SOLUTION INTRAMUSCULAR; INTRAVENOUS; SUBCUTANEOUS PRN
Status: DISCONTINUED | OUTPATIENT
Start: 2024-03-14 | End: 2024-03-14

## 2024-03-14 RX ORDER — NALOXONE HYDROCHLORIDE 0.4 MG/ML
0.2 INJECTION, SOLUTION INTRAMUSCULAR; INTRAVENOUS; SUBCUTANEOUS
Status: DISCONTINUED | OUTPATIENT
Start: 2024-03-14 | End: 2024-03-14

## 2024-03-14 RX ORDER — AMOXICILLIN 250 MG
2 CAPSULE ORAL 2 TIMES DAILY
Status: DISCONTINUED | OUTPATIENT
Start: 2024-03-14 | End: 2024-03-16 | Stop reason: HOSPADM

## 2024-03-14 RX ORDER — IBUPROFEN 800 MG/1
800 TABLET, FILM COATED ORAL EVERY 6 HOURS
Status: DISCONTINUED | OUTPATIENT
Start: 2024-03-15 | End: 2024-03-16 | Stop reason: HOSPADM

## 2024-03-14 RX ORDER — MISOPROSTOL 200 UG/1
800 TABLET ORAL
Status: DISCONTINUED | OUTPATIENT
Start: 2024-03-14 | End: 2024-03-14 | Stop reason: HOSPADM

## 2024-03-14 RX ORDER — PROCHLORPERAZINE MALEATE 10 MG
10 TABLET ORAL EVERY 6 HOURS PRN
Status: DISCONTINUED | OUTPATIENT
Start: 2024-03-14 | End: 2024-03-16 | Stop reason: HOSPADM

## 2024-03-14 RX ORDER — LOPERAMIDE HCL 2 MG
2 CAPSULE ORAL
Status: DISCONTINUED | OUTPATIENT
Start: 2024-03-14 | End: 2024-03-14 | Stop reason: HOSPADM

## 2024-03-14 RX ORDER — SIMETHICONE 80 MG
80 TABLET,CHEWABLE ORAL 4 TIMES DAILY PRN
Status: DISCONTINUED | OUTPATIENT
Start: 2024-03-14 | End: 2024-03-16 | Stop reason: HOSPADM

## 2024-03-14 RX ORDER — MORPHINE SULFATE 1 MG/ML
INJECTION, SOLUTION EPIDURAL; INTRATHECAL; INTRAVENOUS
Status: DISCONTINUED | OUTPATIENT
Start: 2024-03-14 | End: 2024-03-14

## 2024-03-14 RX ORDER — CARBOPROST TROMETHAMINE 250 UG/ML
250 INJECTION, SOLUTION INTRAMUSCULAR
Status: DISCONTINUED | OUTPATIENT
Start: 2024-03-14 | End: 2024-03-16 | Stop reason: HOSPADM

## 2024-03-14 RX ORDER — METOCLOPRAMIDE HYDROCHLORIDE 5 MG/ML
10 INJECTION INTRAMUSCULAR; INTRAVENOUS EVERY 6 HOURS PRN
Status: DISCONTINUED | OUTPATIENT
Start: 2024-03-14 | End: 2024-03-16 | Stop reason: HOSPADM

## 2024-03-14 RX ORDER — METOCLOPRAMIDE 10 MG/1
10 TABLET ORAL EVERY 6 HOURS PRN
Status: DISCONTINUED | OUTPATIENT
Start: 2024-03-14 | End: 2024-03-16 | Stop reason: HOSPADM

## 2024-03-14 RX ORDER — DIPHENHYDRAMINE HYDROCHLORIDE 50 MG/ML
INJECTION INTRAMUSCULAR; INTRAVENOUS PRN
Status: DISCONTINUED | OUTPATIENT
Start: 2024-03-14 | End: 2024-03-14

## 2024-03-14 RX ORDER — OXYTOCIN 10 [USP'U]/ML
10 INJECTION, SOLUTION INTRAMUSCULAR; INTRAVENOUS
Status: DISCONTINUED | OUTPATIENT
Start: 2024-03-14 | End: 2024-03-14 | Stop reason: HOSPADM

## 2024-03-14 RX ORDER — PROCHLORPERAZINE 25 MG
25 SUPPOSITORY, RECTAL RECTAL EVERY 12 HOURS PRN
Status: DISCONTINUED | OUTPATIENT
Start: 2024-03-14 | End: 2024-03-16 | Stop reason: HOSPADM

## 2024-03-14 RX ORDER — LIDOCAINE 40 MG/G
CREAM TOPICAL
Status: DISCONTINUED | OUTPATIENT
Start: 2024-03-14 | End: 2024-03-14 | Stop reason: HOSPADM

## 2024-03-14 RX ORDER — NALOXONE HYDROCHLORIDE 0.4 MG/ML
0.2 INJECTION, SOLUTION INTRAMUSCULAR; INTRAVENOUS; SUBCUTANEOUS
Status: DISCONTINUED | OUTPATIENT
Start: 2024-03-14 | End: 2024-03-14 | Stop reason: HOSPADM

## 2024-03-14 RX ORDER — OXYTOCIN/0.9 % SODIUM CHLORIDE 30/500 ML
340 PLASTIC BAG, INJECTION (ML) INTRAVENOUS CONTINUOUS PRN
Status: DISCONTINUED | OUTPATIENT
Start: 2024-03-14 | End: 2024-03-14 | Stop reason: HOSPADM

## 2024-03-14 RX ORDER — NALBUPHINE HYDROCHLORIDE 20 MG/ML
2.5-5 INJECTION, SOLUTION INTRAMUSCULAR; INTRAVENOUS; SUBCUTANEOUS EVERY 6 HOURS PRN
Status: DISCONTINUED | OUTPATIENT
Start: 2024-03-14 | End: 2024-03-14

## 2024-03-14 RX ORDER — LIDOCAINE 40 MG/G
CREAM TOPICAL
Status: DISCONTINUED | OUTPATIENT
Start: 2024-03-14 | End: 2024-03-16 | Stop reason: HOSPADM

## 2024-03-14 RX ORDER — MISOPROSTOL 200 UG/1
800 TABLET ORAL
Status: DISCONTINUED | OUTPATIENT
Start: 2024-03-14 | End: 2024-03-16 | Stop reason: HOSPADM

## 2024-03-14 RX ORDER — ALBUTEROL SULFATE 90 UG/1
2 AEROSOL, METERED RESPIRATORY (INHALATION) EVERY 4 HOURS PRN
Status: DISCONTINUED | OUTPATIENT
Start: 2024-03-14 | End: 2024-03-16 | Stop reason: HOSPADM

## 2024-03-14 RX ORDER — ACETAMINOPHEN 325 MG/1
975 TABLET ORAL ONCE
Status: COMPLETED | OUTPATIENT
Start: 2024-03-14 | End: 2024-03-14

## 2024-03-14 RX ORDER — METHYLERGONOVINE MALEATE 0.2 MG/ML
200 INJECTION INTRAVENOUS
Status: DISCONTINUED | OUTPATIENT
Start: 2024-03-14 | End: 2024-03-16 | Stop reason: HOSPADM

## 2024-03-14 RX ORDER — LABETALOL 100 MG/1
100 TABLET, FILM COATED ORAL 2 TIMES DAILY
Status: DISCONTINUED | OUTPATIENT
Start: 2024-03-14 | End: 2024-03-16 | Stop reason: HOSPADM

## 2024-03-14 RX ORDER — DEXAMETHASONE SODIUM PHOSPHATE 4 MG/ML
INJECTION, SOLUTION INTRA-ARTICULAR; INTRALESIONAL; INTRAMUSCULAR; INTRAVENOUS; SOFT TISSUE PRN
Status: DISCONTINUED | OUTPATIENT
Start: 2024-03-14 | End: 2024-03-14

## 2024-03-14 RX ORDER — HYDROXYZINE HYDROCHLORIDE 25 MG/1
50 TABLET, FILM COATED ORAL 3 TIMES DAILY PRN
Status: DISCONTINUED | OUTPATIENT
Start: 2024-03-14 | End: 2024-03-15

## 2024-03-14 RX ORDER — OXYTOCIN/0.9 % SODIUM CHLORIDE 30/500 ML
340 PLASTIC BAG, INJECTION (ML) INTRAVENOUS CONTINUOUS PRN
Status: DISCONTINUED | OUTPATIENT
Start: 2024-03-14 | End: 2024-03-16 | Stop reason: HOSPADM

## 2024-03-14 RX ORDER — VENLAFAXINE HYDROCHLORIDE 150 MG/1
150 TABLET, EXTENDED RELEASE ORAL DAILY
Status: DISCONTINUED | OUTPATIENT
Start: 2024-03-14 | End: 2024-03-16 | Stop reason: HOSPADM

## 2024-03-14 RX ORDER — EPINEPHRINE 1 MG/ML
INJECTION, SOLUTION, CONCENTRATE INTRAVENOUS
Status: DISCONTINUED | OUTPATIENT
Start: 2024-03-14 | End: 2024-03-14

## 2024-03-14 RX ORDER — AMOXICILLIN 250 MG
1 CAPSULE ORAL 2 TIMES DAILY
Status: DISCONTINUED | OUTPATIENT
Start: 2024-03-14 | End: 2024-03-16 | Stop reason: HOSPADM

## 2024-03-14 RX ORDER — METOCLOPRAMIDE HYDROCHLORIDE 5 MG/ML
10 INJECTION INTRAMUSCULAR; INTRAVENOUS EVERY 6 HOURS
Status: DISCONTINUED | OUTPATIENT
Start: 2024-03-14 | End: 2024-03-16 | Stop reason: HOSPADM

## 2024-03-14 RX ORDER — FENTANYL CITRATE-0.9 % NACL/PF 10 MCG/ML
100 PLASTIC BAG, INJECTION (ML) INTRAVENOUS EVERY 5 MIN PRN
Status: DISCONTINUED | OUTPATIENT
Start: 2024-03-14 | End: 2024-03-14 | Stop reason: HOSPADM

## 2024-03-14 RX ORDER — ONDANSETRON 2 MG/ML
4 INJECTION INTRAMUSCULAR; INTRAVENOUS EVERY 6 HOURS PRN
Status: DISCONTINUED | OUTPATIENT
Start: 2024-03-14 | End: 2024-03-16 | Stop reason: HOSPADM

## 2024-03-14 RX ORDER — OXYCODONE HYDROCHLORIDE 5 MG/1
5 TABLET ORAL EVERY 4 HOURS PRN
Status: DISCONTINUED | OUTPATIENT
Start: 2024-03-14 | End: 2024-03-16 | Stop reason: HOSPADM

## 2024-03-14 RX ORDER — OXYTOCIN/0.9 % SODIUM CHLORIDE 30/500 ML
100-340 PLASTIC BAG, INJECTION (ML) INTRAVENOUS CONTINUOUS PRN
Status: DISCONTINUED | OUTPATIENT
Start: 2024-03-14 | End: 2024-03-16 | Stop reason: HOSPADM

## 2024-03-14 RX ORDER — METHYLERGONOVINE MALEATE 0.2 MG/ML
200 INJECTION INTRAVENOUS
Status: DISCONTINUED | OUTPATIENT
Start: 2024-03-14 | End: 2024-03-14

## 2024-03-14 RX ORDER — CEFAZOLIN SODIUM/WATER 3 G/30 ML
3 SYRINGE (ML) INTRAVENOUS SEE ADMIN INSTRUCTIONS
Status: DISCONTINUED | OUTPATIENT
Start: 2024-03-14 | End: 2024-03-14 | Stop reason: HOSPADM

## 2024-03-14 RX ORDER — MISOPROSTOL 200 UG/1
400 TABLET ORAL
Status: DISCONTINUED | OUTPATIENT
Start: 2024-03-14 | End: 2024-03-14 | Stop reason: HOSPADM

## 2024-03-14 RX ORDER — BUPIVACAINE HYDROCHLORIDE 2.5 MG/ML
INJECTION, SOLUTION EPIDURAL; INFILTRATION; INTRACAUDAL
Status: DISCONTINUED | OUTPATIENT
Start: 2024-03-14 | End: 2024-03-14

## 2024-03-14 RX ORDER — NALOXONE HYDROCHLORIDE 0.4 MG/ML
0.4 INJECTION, SOLUTION INTRAMUSCULAR; INTRAVENOUS; SUBCUTANEOUS
Status: DISCONTINUED | OUTPATIENT
Start: 2024-03-14 | End: 2024-03-16 | Stop reason: HOSPADM

## 2024-03-14 RX ORDER — ACETAMINOPHEN 325 MG/1
975 TABLET ORAL EVERY 6 HOURS
Status: DISCONTINUED | OUTPATIENT
Start: 2024-03-14 | End: 2024-03-16 | Stop reason: HOSPADM

## 2024-03-14 RX ORDER — ONDANSETRON 4 MG/1
4 TABLET, ORALLY DISINTEGRATING ORAL EVERY 6 HOURS PRN
Status: DISCONTINUED | OUTPATIENT
Start: 2024-03-14 | End: 2024-03-14 | Stop reason: HOSPADM

## 2024-03-14 RX ORDER — ONDANSETRON 2 MG/ML
INJECTION INTRAMUSCULAR; INTRAVENOUS PRN
Status: DISCONTINUED | OUTPATIENT
Start: 2024-03-14 | End: 2024-03-14

## 2024-03-14 RX ORDER — CEFAZOLIN SODIUM/WATER 3 G/30 ML
3 SYRINGE (ML) INTRAVENOUS
Status: COMPLETED | OUTPATIENT
Start: 2024-03-14 | End: 2024-03-14

## 2024-03-14 RX ORDER — TRANEXAMIC ACID 10 MG/ML
1 INJECTION, SOLUTION INTRAVENOUS EVERY 30 MIN PRN
Status: DISCONTINUED | OUTPATIENT
Start: 2024-03-14 | End: 2024-03-14 | Stop reason: HOSPADM

## 2024-03-14 RX ORDER — GUANFACINE 1 MG/1
1 TABLET ORAL AT BEDTIME
Status: DISCONTINUED | OUTPATIENT
Start: 2024-03-14 | End: 2024-03-16 | Stop reason: HOSPADM

## 2024-03-14 RX ORDER — SODIUM CHLORIDE, SODIUM LACTATE, POTASSIUM CHLORIDE, CALCIUM CHLORIDE 600; 310; 30; 20 MG/100ML; MG/100ML; MG/100ML; MG/100ML
INJECTION, SOLUTION INTRAVENOUS CONTINUOUS PRN
Status: DISCONTINUED | OUTPATIENT
Start: 2024-03-14 | End: 2024-03-14

## 2024-03-14 RX ORDER — CARBOPROST TROMETHAMINE 250 UG/ML
250 INJECTION, SOLUTION INTRAMUSCULAR
Status: DISCONTINUED | OUTPATIENT
Start: 2024-03-14 | End: 2024-03-14 | Stop reason: HOSPADM

## 2024-03-14 RX ORDER — KETOROLAC TROMETHAMINE 30 MG/ML
30 INJECTION, SOLUTION INTRAMUSCULAR; INTRAVENOUS EVERY 6 HOURS
Status: COMPLETED | OUTPATIENT
Start: 2024-03-14 | End: 2024-03-15

## 2024-03-14 RX ORDER — KETOROLAC TROMETHAMINE 30 MG/ML
INJECTION, SOLUTION INTRAMUSCULAR; INTRAVENOUS PRN
Status: DISCONTINUED | OUTPATIENT
Start: 2024-03-14 | End: 2024-03-14

## 2024-03-14 RX ORDER — ONDANSETRON 2 MG/ML
4 INJECTION INTRAMUSCULAR; INTRAVENOUS EVERY 6 HOURS PRN
Status: DISCONTINUED | OUTPATIENT
Start: 2024-03-14 | End: 2024-03-14 | Stop reason: HOSPADM

## 2024-03-14 RX ORDER — BUPIVACAINE HYDROCHLORIDE 7.5 MG/ML
INJECTION, SOLUTION INTRASPINAL
Status: DISCONTINUED | OUTPATIENT
Start: 2024-03-14 | End: 2024-03-14

## 2024-03-14 RX ADMIN — PHENYLEPHRINE HYDROCHLORIDE 100 MCG: 10 INJECTION INTRAVENOUS at 12:41

## 2024-03-14 RX ADMIN — EPHEDRINE SULFATE 5 MG: 5 INJECTION INTRAVENOUS at 12:53

## 2024-03-14 RX ADMIN — PHENYLEPHRINE HYDROCHLORIDE 50 MCG/MIN: 10 INJECTION INTRAVENOUS at 11:31

## 2024-03-14 RX ADMIN — ACETAMINOPHEN 975 MG: 325 TABLET, FILM COATED ORAL at 10:13

## 2024-03-14 RX ADMIN — TRANEXAMIC ACID 1 G: 1 INJECTION, SOLUTION INTRAVENOUS at 12:52

## 2024-03-14 RX ADMIN — EPHEDRINE SULFATE 5 MG: 5 INJECTION INTRAVENOUS at 12:40

## 2024-03-14 RX ADMIN — DEXMEDETOMIDINE HYDROCHLORIDE 8 MCG: 100 INJECTION, SOLUTION INTRAVENOUS at 11:33

## 2024-03-14 RX ADMIN — BUPIVACAINE HYDROCHLORIDE 20 ML: 2.5 INJECTION, SOLUTION EPIDURAL; INFILTRATION; INTRACAUDAL at 13:40

## 2024-03-14 RX ADMIN — EPINEPHRINE 0.2 MG: 1 INJECTION, SOLUTION, CONCENTRATE INTRAVENOUS at 11:55

## 2024-03-14 RX ADMIN — ACETAMINOPHEN 975 MG: 325 TABLET, FILM COATED ORAL at 17:08

## 2024-03-14 RX ADMIN — DEXMEDETOMIDINE HYDROCHLORIDE 12 MCG: 100 INJECTION, SOLUTION INTRAVENOUS at 11:28

## 2024-03-14 RX ADMIN — BUPIVACAINE 20 ML: 13.3 INJECTION, SUSPENSION, LIPOSOMAL INFILTRATION at 13:40

## 2024-03-14 RX ADMIN — ONDANSETRON 4 MG: 2 INJECTION INTRAMUSCULAR; INTRAVENOUS at 11:27

## 2024-03-14 RX ADMIN — LABETALOL HYDROCHLORIDE 100 MG: 100 TABLET, FILM COATED ORAL at 20:52

## 2024-03-14 RX ADMIN — PHENYLEPHRINE HYDROCHLORIDE 100 MCG: 10 INJECTION INTRAVENOUS at 12:02

## 2024-03-14 RX ADMIN — FENTANYL CITRATE 15 MCG: 50 INJECTION INTRAMUSCULAR; INTRAVENOUS at 11:55

## 2024-03-14 RX ADMIN — DIPHENHYDRAMINE HYDROCHLORIDE 25 MG: 50 INJECTION, SOLUTION INTRAMUSCULAR; INTRAVENOUS at 12:39

## 2024-03-14 RX ADMIN — SODIUM CHLORIDE, POTASSIUM CHLORIDE, SODIUM LACTATE AND CALCIUM CHLORIDE: 600; 310; 30; 20 INJECTION, SOLUTION INTRAVENOUS at 11:27

## 2024-03-14 RX ADMIN — FOSAPREPITANT DIMEGLUMINE 150 MG: 150 INJECTION, POWDER, LYOPHILIZED, FOR SOLUTION INTRAVENOUS at 13:08

## 2024-03-14 RX ADMIN — Medication 3 G: at 12:01

## 2024-03-14 RX ADMIN — FAMOTIDINE 20 MG: 10 INJECTION, SOLUTION INTRAVENOUS at 10:32

## 2024-03-14 RX ADMIN — SODIUM CHLORIDE, POTASSIUM CHLORIDE, SODIUM LACTATE AND CALCIUM CHLORIDE 500 ML: 600; 310; 30; 20 INJECTION, SOLUTION INTRAVENOUS at 09:23

## 2024-03-14 RX ADMIN — OMEPRAZOLE 20 MG: 20 CAPSULE, DELAYED RELEASE ORAL at 20:58

## 2024-03-14 RX ADMIN — KETOROLAC TROMETHAMINE 30 MG: 30 INJECTION, SOLUTION INTRAMUSCULAR at 20:52

## 2024-03-14 RX ADMIN — EPHEDRINE SULFATE 5 MG: 5 INJECTION INTRAVENOUS at 12:36

## 2024-03-14 RX ADMIN — DEXAMETHASONE SODIUM PHOSPHATE 8 MG: 4 INJECTION, SOLUTION INTRA-ARTICULAR; INTRALESIONAL; INTRAMUSCULAR; INTRAVENOUS; SOFT TISSUE at 12:37

## 2024-03-14 RX ADMIN — EPHEDRINE SULFATE 25 MG: 50 INJECTION INTRAVENOUS at 13:13

## 2024-03-14 RX ADMIN — PHENYLEPHRINE HYDROCHLORIDE 200 MCG: 10 INJECTION INTRAVENOUS at 11:48

## 2024-03-14 RX ADMIN — BUPIVACAINE HYDROCHLORIDE IN DEXTROSE 1.6 ML: 7.5 INJECTION, SOLUTION SUBARACHNOID at 11:55

## 2024-03-14 RX ADMIN — SODIUM CITRATE AND CITRIC ACID MONOHYDRATE 30 ML: 500; 334 SOLUTION ORAL at 11:18

## 2024-03-14 RX ADMIN — SODIUM CITRATE AND CITRIC ACID MONOHYDRATE 30 ML: 500; 334 SOLUTION ORAL at 11:23

## 2024-03-14 RX ADMIN — EPHEDRINE SULFATE 5 MG: 5 INJECTION INTRAVENOUS at 12:29

## 2024-03-14 RX ADMIN — SODIUM CHLORIDE, POTASSIUM CHLORIDE, SODIUM LACTATE AND CALCIUM CHLORIDE 250 ML: 600; 310; 30; 20 INJECTION, SOLUTION INTRAVENOUS at 14:31

## 2024-03-14 RX ADMIN — GUANFACINE 1 MG: 1 TABLET ORAL at 20:53

## 2024-03-14 RX ADMIN — EPHEDRINE SULFATE 5 MG: 5 INJECTION INTRAVENOUS at 12:22

## 2024-03-14 RX ADMIN — Medication 600 ML/HR: at 12:34

## 2024-03-14 RX ADMIN — KETOROLAC TROMETHAMINE 30 MG: 30 INJECTION, SOLUTION INTRAMUSCULAR at 13:48

## 2024-03-14 RX ADMIN — METOCLOPRAMIDE HYDROCHLORIDE 10 MG: 5 INJECTION INTRAMUSCULAR; INTRAVENOUS at 12:26

## 2024-03-14 RX ADMIN — MORPHINE SULFATE 0.15 MG: 1 INJECTION EPIDURAL; INTRATHECAL; INTRAVENOUS at 11:55

## 2024-03-14 ASSESSMENT — ACTIVITIES OF DAILY LIVING (ADL)
ADLS_ACUITY_SCORE: 20
ADLS_ACUITY_SCORE: 25
ADLS_ACUITY_SCORE: 20
ADLS_ACUITY_SCORE: 25
ADLS_ACUITY_SCORE: 20

## 2024-03-14 ASSESSMENT — ENCOUNTER SYMPTOMS: SEIZURES: 0

## 2024-03-14 NOTE — PLAN OF CARE
Goal Outcome Evaluation:             Data: Irina Alcantara transferred to [id number] via zoomcart at 1540. Baby transferred via parent's arms.  Action: Receiving unit notified of transfer: Yes. Patient and family notified of room change. Report given to Kelsey RN at 1525. Belongings sent to receiving unit. Accompanied by Registered Nurse. Oriented patient to surroundings. Call light within reach. ID bands double-checked with receiving RN.  Response: Patient tolerated transfer and is stable.

## 2024-03-14 NOTE — OP NOTE
[facility]  Full Operative Progress Note     Surgery Date:  3/14/2024    Surgeon: Courtney Kurtz MD    Assistants:   - Jamie Escalante MD - PGY4    Pre-op Diagnosis:   - Intrauterine pregnancy at 37w0d  - Previous  section  - Severe polyhydramnios  - Desired sterilzation  - cHTN      Post-op Diagnosis:   - Same, s/p procedure    Procedure:  Repeat low-transverse  section with single layer uterine closure via Pfannenstiel incision, bilateral salpingectomy    Anesthesia: Spinal    EBL: 570 mL    IVF: 180 mL crystalloid    UOP: 50 mL clear urine at the end of the case    Drains: Márquez Catheter     Specimens:   ID Type Source Tests Collected by Time Destination   A :  Tissue Umbilical Cord SEE PROVIDERS ORDERS Courtney Kurtz MD 3/14/2024 12:41 PM    B :  Tissue Fallopian Tube, Bilateral SEE PROVIDERS ORDERS Courtney Kurtz MD 3/14/2024 12:57 PM      Complications: None apparent    Indications:   Irina Alcantara is a 33 year old  at 37w0d admitted for scheduled repeat  section in setting of severe polyhydramnios, cHTN.  The risks, benefits, and alternatives of  section were discussed with the patient, and she agreed to proceed.     Findings:   Clear amniotic fluid  Liveborn infant in cephalic presentation. Apgars 6 at 1 minute & 8 at 5 minutes. Weight 3515g  Normal uterus, fallopian tubes, and ovaries.   4.    Filmy adhesions of omentum to uterine fundus.   Procedure Details:   The patient was brought to the OR, where adequate spinal anesthesia was administered.  She was placed in the dorsal supine position with a slight leftward tilt. She was prepped and draped in the usual sterile fashion. A surgical time out was performed. A pfannenstiel skin incision was made with the scalpel, and carried down to the underlying fascia with sharp and blunt dissection. The fascia was incised in the midline, and the incision was extended laterally  with the Raymundo scissors. The superior aspect of the fascia was grasped with the Kocher clamps and dissected off of the underlying rectus muscles with blunt and sharp dissection. An area of omentum was adherent to the anterior uterus which was cauterized. A small area of oozing was noted on the uterus where this was removed which was made hemostatic with a figure of X of 3-0 Vicryl. Attention was then turned to the inferior aspect of the fascia, which was similarly dissected off of the underlying rectus muscles. The rectus muscles were  in the midline, and the peritoneum was entered bluntly, and the opening was extended with digital pressure. The bladder blade was placed. The infant was noted to be in the cephalic position, and was delivered atraumatically. The shoulders delivered easily. The cord was doubly clamped and cut, and the infant was handed off to the awaiting NICU staff. The placenta was delivered with gentle traction on the umbilical cord and uterine massage. The uterus was exteriorized and cleared of all clots and debris. Uterine tone was noted to be adequate with 60 units of pitocin given through the running IV and uterine massage.  The hysterotomy was closed with a running locked suture of 0 Vicryl.  The hysterotomy was noted to be hemostatic. Attention was then turned to the salpingectomy portio of the case. The left tube was grasped with two lauri clamps. The mesosalpinx was ligated in stepwise fashion toward the cornua until the tube could be excised. This was repeated on the right. The posterior cul-de-sac was cleared of all clots and debris. The uterus was returned to the abdomen. The pericolic gutters were cleared of all clots and debris. Bilateral salpingectomy pedicles were visualized in the abdomen and noted to be hemostatic. The hysterotomy was reexamined and noted to be hemostatic. The fascia and rectus muscles were examined and areas of oozing were controlled with electrocautery.  The fascia was closed with a running 0 Vicryl suture. The subcutaneous tissue was irrigated and areas of oozing were controlled with electrocautery. The subcutaneous tissue was greater than 2 cm in thickness, and was therefore closed with 0-vicryl. The skin was closed with 4-0 Monocryl and covered with a sterile dressing.    All sponge, needle, and instrument counts were correct. The patient tolerated the procedure well, and was transferred to recovery in stable condition. Dr. Kurtz was present and scrubbed for the entirety of the procedure.     Jamie Escalante MD  Obstetrics, Gynecology, and Women's Health  PGY4  10:39 AM 03/14/2024    I was present and scrubbed for entire procedure.   Courtney Kurtz MD

## 2024-03-14 NOTE — H&P
OB History and Physical     Irina Alcantara    MRN# [id number]  YOB: 1990      HPI: Irina Alcantara is a 33 year old  at 37w0d by LMP c/w 7w1d US who presents today for scheduled repeat  delivery.    Patient shares she had a negative delivery experience her last  section. She otherwise is feeling well, ready to be delivered. Denies headaches, changes in vision, SOB, RUQ pain. Patient does have asthma, but rare albuterol use.     Her previous pregnancies were notable for  delivery. Her delivery was complicated by inadequate pain control requiring general anesthesia. Denies history of postpartum hemorrhage, shoulder dystocia, pre-eclampsia.     She reports good fetal movement. Denies LOF, vaginal bleeding, or contractions .  She denies fever, chills, SOB, chest pain, palpitations, N/V, LE swelling/tenderness.  No concerns for headache, vision changes, RUQ or epigastric pain      Pregnancy notable for:   cHTN  Severe polyhydramnios  GERD  Asthma  HELEN   Tachycardia     Prenatal Labs:   Lab Results   Component Value Date    AS Negative 2024    CHPCRT  2011     Negative for C. trachomatis rRNA by transcription mediated amplification.   A negative result by transcription mediated amplification does not preclude the   presence of C. trachomatis infection because results are dependent on proper   and adequate collection, absence of inhibitors, and sufficient rRNA to be   detected.   A negative urine result for a female patient who is clinically suspected of   having a chlamydial infection does not rule out the presence of C. trachomatis   in the urogenital tract.    GCPCRT  2011     Negative for N. gonorrhoeae rRNA by transcription mediated amplification.   A negative result by transcription mediated amplification does not preclude the   presence of N. gonorrhoeae infection because results are dependent on proper   and adequate collection, absence of  inhibitors, and sufficient rRNA to be   detected.   A negative urine result for a female patient who is clinically suspect of   having a gonococcal infection does not rule out the presence of N. gonorrhoeae   in the urogenital tract.    HGB 11.3 (L) 2024         OBHX:   OB History    Para Term  AB Living   3 1 1 0 1 1   SAB IAB Ectopic Multiple Live Births   1 0 0 0 1      # Outcome Date GA Lbr Real/2nd Weight Sex Delivery Anes PTL Lv   3 Current            2 SAB      SAB      1 Term      CS-Unspec   MIRELLA       MedicalHX:   Past Medical History:   Diagnosis Date    Depressive disorder     HELEN (generalized anxiety disorder)     Hypertension     Migraine     Uncomplicated asthma     exercise induced       SurgicalHX:   Past Surgical History:   Procedure Laterality Date    GALLBLADDER SURGERY         Medications:   No current facility-administered medications on file prior to encounter.  albuterol (PROVENTIL HFA: VENTOLIN HFA) 108 (90 BASE) MCG/ACT inhaler, Inhale 2 puffs into the lungs every 4 hours as needed for shortness of breath / dyspnea.  doxylamine (UNISOM) 25 MG TABS tablet, Take 25 mg by mouth at bedtime  guanFACINE (TENEX) 1 MG tablet, Take 1 mg by mouth at bedtime  hydrOXYzine HCl (ATARAX) 50 MG tablet, Take 50 mg by mouth 3 times daily as needed for itching  labetalol (NORMODYNE) 100 MG tablet, Take 100 mg by mouth 2 times daily  Omeprazole 20 MG tablet, Take 20 mg by mouth 2 times daily.  venlafaxine (EFFEXOR-ER) 150 MG 24 hr tablet, Take 150 mg by mouth daily  aspirin 81 MG EC tablet, Take 162 mg by mouth daily  NO ACTIVE MEDICATIONS,         Allergies:  Allergies   Allergen Reactions    Trazodone Itching       FamilyHX:  History reviewed. No pertinent family history.    SocialHX:   Social History     Socioeconomic History    Marital status:    Tobacco Use    Smoking status: Some Days     Types: Cigarettes    Smokeless tobacco: Never   Substance and Sexual Activity    Alcohol  "use: No    Drug use: No    Sexual activity: Yes     Partners: Male     Birth control/protection: Condom       ROS: 10 point ROS negative other than above    Physical Exam:  Patient Vitals for the past 24 hrs:   BP Temp Temp src Resp Height Weight   24 0904 (!) 137/93 98.4  F (36.9  C) Oral 20 1.702 m (5' 7\") 135.2 kg (298 lb)     General: AAOx3, appropriately interactive, NAD, appears generally well  CV: RRR, normal S1/S2, no m/r/g  Lungs: CTAB, non-labored breathing, no wheezes, rales, or rhonchi  Abdomen: soft, gravid, non-tender  Extremities: Non-tender with 1+ edema bilaterally in LE    FHT: 150 BPM, moderate variability, mod accels present, no decels   Berino: several contractions in ten minutes, palpate mild    Labs:   CBC RESULTS:   Recent Labs   Lab Test 24  0900   WBC 12.5*   RBC 4.18   HGB 11.3*   HCT 34.6*   MCV 83   MCH 27.0   MCHC 32.7   RDW 13.9          Assessment & Plan: 33 year old  at 37w0d by 7w1d US, here for scheduled repeat  section. Pregnancy is notable for cHTN, HELEN/MDD, GERD, Asthma, tachycardia.     # Scheduled  Section  Indicated due to repeat.  Will plan for a Pfannenstiel skin incision with low transverse hysterotomy  - Labs: CBC, T&S, RPR  - Placenta: anterior  - Anesthesia: Spinal  - 2g Ancef   - PPH Meds/Ppx: Avoid methergine  - Diet: NPO  - Consent: Discussed risks and benefits of procedure, including but not limited to bleeding, infection, injury to surrounding organs, injury to infant, and the potential need for another surgery should some injury go unrecognized or patient were to have continued bleeding. Patient had time to ask questions and expressed understanding of procedure and associated risks. Agreed to blood transfusion and hysterectomy if necessary. Consent signed. Patient additionally consented for tubal sterilization. Patient has private insurance, so federal tubal papers do not apply.     #Anxiety/depression  - PTA venlafaxine and " guanfacine  - Follows closely with therapist twice a month  - Mood is stable and manageable at this time    #cHTN  - Hospitalized from 1/16-17 s/p betamethasone and magnesium for workup for worsening cHTN  - Serial BP monitoring   - IV Antihypertensives prn for sustained severe range blood pressures (>160/>110)  - Continue PTA labetalol 100mg BID  - Will consider adding more labetalol if persistent high mild range blood pressures  - Labs: HELLP labs on admission, pending     #GERD  - PTA Omeprazole 20mg BID     #Asthma  - Stable and not requiring use of albuterol during this pregnancy.     #Tachycardia  - s/p Ziopatch x2 weeks: min heart rate of 63 bpm, max of 180 bpm, average 95 bpm. No abnormalities noted in preliminary reading.  - Normal TSH of 2.31 on 12/8     #Routine PNC  - Prenatal labs:  Rh: +  antibody: neg               HepB/HIV/RPR/HepC: nonreactive               GC/CT: negative               Rubella: immune                 GCT: pass  - Pap smear: NIL and HPV neg (8/2023)  - Immunizations:  s/p TDap 1/26/24  - Genetic screening: low-risk NIPT  - GBS neg    # FWB:   - Cat I tracing, reactive    Patient discussed with Dr. Kurtz.     Jamie Escalante MD  Obstetrics, Gynecology, and Women's Health  PGY4  10:35 AM 03/14/2024    Appreciate note by Dr. Escalante. Patient has been seen and examined by me separate from the resident, agree with above note.     Courtney Kurtz MD  10:44 AM

## 2024-03-14 NOTE — ANESTHESIA PROCEDURE NOTES
"Intrathecal injection Procedure Note    Pre-Procedure   Staff -        Anesthesiologist:  Concepcion West MD       Performed By: anesthesiologist       Location: OB       Procedure Start/Stop Times: 3/14/2024 11:50 AM and 3/14/2024 12:00 PM       Pre-Anesthestic Checklist: patient identified, IV checked, risks and benefits discussed, informed consent, monitors and equipment checked, pre-op evaluation, at physician/surgeon's request and post-op pain management  Timeout:       Correct Patient: Yes        Correct Procedure: Yes        Correct Site: Yes        Correct Position: Yes   Procedure Documentation  Procedure: intrathecal injection       Patient Position: sitting       Skin prep: Chloraprep       Insertion Site: L3-4. (midline approach).       Needle Gauge: 25.        Needle Length (Inches): 3.5        Spinal Needle Type: Pencan       Introducer used       Introducer: 20 G       # of attempts: 2 and  # of redirects:  1    Assessment/Narrative         Paresthesias: No.       Sensory Level: T4       CSF fluid: clear.       Opening pressure was cmH2O while  Sitting.      Medication(s) Administered   0.75% Hyperbaric Bupivacaine (Intrathecal) - Intrathecal   1.6 mL - 3/14/2024 11:55:00 AM  Epinephrine 1000 mcg/mL (Intrathecal) - Intrathecal   0.2 mg - 3/14/2024 11:55:00 AM  Fentanyl PF (Intrathecal) - Intrathecal   15 mcg - 3/14/2024 11:55:00 AM  Morphine PF 1 mg/mL (Intrathecal) - Intrathecal   0.15 mg - 3/14/2024 11:55:00 AM  Medication Administration Time: 3/14/2024 11:50 AM     Comments:  First attempt patient had vasovagal episode and required laying down, everything removed and patient quickly placed in supine position. After several minutes of laying down patient was willing to sit up again, single attempt and one redirect for intrathecal dose. No further immediate complications.       FOR [facility] ([facility]) ONLY:   Pain Team Contact information: please page the Pain Team Via [employer]. Search \"Pain\". " During daytime hours, please page the attending first. At night please page the resident first.

## 2024-03-14 NOTE — PLAN OF CARE
Patient presented to unit for scheduled . Prep completed. C/S delivery of viable Female with Dr. Kurtz and Dr. Escalante in attendance.  NICU present at delivery, see NICU note for more detail. Placenta delivered with out complication. Anesthesia team performed bilateral transverse abdominal plane block. Patient was transferred to labor and delivery recovery room by anesthesia resident and Anastasiia GARNETT RN.

## 2024-03-14 NOTE — ANESTHESIA POSTPROCEDURE EVALUATION
Patient: Irina Alcantara    Procedure: Procedure(s):   section, tubal ligation, combined       Anesthesia Type:  Spinal    Note:  Disposition: Inpatient   Postop Pain Control: Uneventful            Sign Out: Well controlled pain   PONV: No   Neuro/Psych: Uneventful            Sign Out: Acceptable/Baseline neuro status   Airway/Respiratory: Uneventful            Sign Out: Acceptable/Baseline resp. status   CV/Hemodynamics:             Sign Out: Detailed CV status               Blood Pressure: Normal               Rate/Rhythm: Tachycardia (continues to have tachycardia despite appropriate fluid rescucitation. She has been worked up for tachycardia recently (echo WNL, holter WNL). Has improved since this morning.)               Perfusion:  Adequate perfusion indices   Other NRE: NONE   DID A NON-ROUTINE EVENT OCCUR? No           Last vitals:  Vitals Value Taken Time   /65 24 1535   Temp 36.6  C (97.9  F) 24 1349   Pulse 109 24 1536   Resp 19 24 1526   SpO2 99 % 24 1536   Vitals shown include unfiled device data.    Electronically Signed By: Concepcion West MD  2024  4:04 PM

## 2024-03-14 NOTE — ANESTHESIA CARE TRANSFER NOTE
Patient: Irina Alcantara    Procedure: Procedure(s):   section, tubal ligation, combined       Diagnosis: Hx of  section [Z98.891]  Diagnosis Additional Information: No value filed.    Anesthesia Type:   Spinal     Note:    Oropharynx: oropharynx clear of all foreign objects  Level of Consciousness: awake      Independent Airway: airway patency satisfactory and stable  Dentition: dentition unchanged  Vital Signs Stable: post-procedure vital signs reviewed and stable    Patient transferred to: PACU    Handoff Report: Identifed the Patient, Identified the Reponsible Provider, Reviewed the pertinent medical history, Discussed the surgical course, Reviewed Intra-OP anesthesia mangement and issues during anesthesia, Set expectations for post-procedure period and Allowed opportunity for questions and acknowledgement of understanding      Vitals:  Vitals Value Taken Time   /56 24 1355   Temp 36.6  C (97.9  F) 24 1349   Pulse 86 24 1358   Resp 12 24 1358   SpO2 98 % 24 1358   Vitals shown include unfiled device data.    Electronically Signed By: Concepcion West MD  2024  1:59 PM

## 2024-03-14 NOTE — PLAN OF CARE
Pt. admitted from L&D via bed.. Pt. arrived with baby and was accompanied by  and arrived with personal belongings. Report was taken from Kristen GARNETT in L&D.  Pt. is A&Ox3 and VSS on RA. Fundus is firm and midline.  Vaginal bleeding is scant to light.   Pt. c/o 3/10 pain. Pt. denied any nausea, CP, SOB, lightheadedness, or dizziness. LS clear and BS active. PIV patent and infusing.  Márquez patent and draining.  Dressing to lower abdomen CDI.  Pneumoboots in place to BLE.  Pt. oriented to the room and call light system. Encouraged to call with question and/or concerns.

## 2024-03-14 NOTE — ANESTHESIA PROCEDURE NOTES
"TAP Procedure Note    Pre-Procedure   Staff -        Anesthesiologist:  Concepcion West MD       Resident/Fellow: Edith Reardon MD       Performed By: resident       Location: OB       Pre-Anesthestic Checklist: patient identified, IV checked, risks and benefits discussed, informed consent, pre-op evaluation and post-op pain management  Timeout:       Correct Patient: Yes        Correct Procedure: Yes        Correct Site: Yes        Correct Position: Yes        Correct Laterality: N/A   Procedure Documentation  Procedure: TAP       Laterality: bilateral       Patient Position: supine       Skin prep: Chloraprep       Needle Gauge: 21.        Needle Length (millimeters): 110        Ultrasound guided       1. Ultrasound was used to identify targeted nerve, plexus, vascular marker, or fascial plane and place a needle adjacent to it in real-time.       2. Ultrasound was used to visualize the spread of anesthetic in close proximity to the above referenced structure.    Assessment/Narrative         The placement was negative for: blood aspirated, painful injection and site bleeding       Paresthesias: No.       Bolus given via needle. no blood aspirated via catheter.        Secured via.        Insertion/Infusion Method: Single Shot       Complications: none    Medication(s) Administered   Bupivacaine 0.25% PF (Infiltration) - Infiltration   20 mL - 3/14/2024 1:40:00 PM  Bupivacaine liposome (Exparel) 1.3% LA inj susp (Infiltration) - Infiltration   20 mL - 3/14/2024 1:40:00 PM    FOR [facility] ([facility]) ONLY:   Pain Team Contact information: please page the Pain Team Via [employer]. Search \"Pain\". During daytime hours, please page the attending first. At night please page the resident first.      "

## 2024-03-14 NOTE — ANESTHESIA CARE TRANSFER NOTE
Patient: Irina Alcantara    Procedure: Procedure(s):   section, tubal ligation, combined       Diagnosis: Hx of  section [Z98.891]  Diagnosis Additional Information: No value filed.    Anesthesia Type:   Spinal     Note:    Oropharynx: oropharynx clear of all foreign objects and spontaneously breathing  Level of Consciousness: awake  Oxygen Supplementation: room air    Independent Airway: airway patency satisfactory and stable    Vital Signs Stable: post-procedure vital signs reviewed and stable  Report to RN Given: handoff report given  Patient transferred to: PACU    Handoff Report: Identifed the Patient, Identified the Reponsible Provider, Reviewed the pertinent medical history, Discussed the surgical course, Reviewed Intra-OP anesthesia mangement and issues during anesthesia, Set expectations for post-procedure period and Allowed opportunity for questions and acknowledgement of understanding      Vitals:  Vitals Value Taken Time   /56 24 1359   Temp 36.6  C (97.9  F) 24 1349   Pulse 100 24 1402   Resp 13 24 1402   SpO2 100 % 24 1402   Vitals shown include unfiled device data.    Electronically Signed By: JOHANNY CM MD  2024  2:02 PM

## 2024-03-14 NOTE — DISCHARGE SUMMARY
[facility] Discharge Summary    Irina Alcantara MRN# [id number]   Age: 33 year old YOB: 1990     Date of Admission:  3/14/2024  Date of Discharge:  3/16/2024  Admitting Physician:  Courtney Kurtz MD  Discharge Physician:  Radha Man MD    Admit Dx:   cHTN  Severe polyhydramnios  GERD  Asthma  HELEN   Tachycardia     Discharge Dx:  - Same as above, now  s/p repeat low transverse  section    Procedures:  - Repeat low transverse  section with single layer uterine closure via Pfannenstiel incision, bilateral salpingectomy  - Spinal analgesia    Admit HPI:  Irina Alcantara is a 33 year old  at 37w0d admitted for scheduled repeat  section in setting of severe polyhydramnios, cHTN.  The risks, benefits, and alternatives of  section were discussed with the patient, and she agreed to proceed.     Please see her admit H&P for full details of her PMH, PSH, Meds, Allergies and exam on admit.    Operative Course:  Surgery was uncomplicated. EBL from the delivery was 570 mL. Please see her  Section Operative Note for full details regarding her delivery.    Operative Findings:   Findings:   Clear amniotic fluid  Liveborn infant in cephalic presentation. Apgars 6 at 1 minute & 8 at 5 minutes. Weight 3515g  Normal uterus, fallopian tubes, and ovaries.     Postoperative Course:  Her postoperative course was uncomplicated . On POD#2, she was meeting all of her postpartum goals and deemed stable for discharge. She was voiding without difficulty, tolerating a regular diet without nausea and vomiting, her pain was well controlled on oral pain medicines and her lochia was appropriate. Her hemoglobin prior to delivery was 11.3 and after delivery was 9.3. Her Rh status was pos and Rhogam was not indicated.    Discharge Medications:     Review of your medicines        START taking        Dose / Directions   acetaminophen 325 MG  tablet  Commonly known as: TYLENOL  Used for: S/P  section      Dose: 650 mg  Take 2 tablets (650 mg) by mouth every 6 hours as needed for mild pain Start after Delivery.  Quantity: 100 tablet  Refills: 0     ibuprofen 600 MG tablet  Commonly known as: ADVIL/MOTRIN  Used for: S/P  section      Dose: 600 mg  Take 1 tablet (600 mg) by mouth every 6 hours as needed for moderate pain Start after delivery  Quantity: 60 tablet  Refills: 0     oxyCODONE 5 MG tablet  Commonly known as: ROXICODONE  Used for: S/P  section      Dose: 5 mg  Take 1 tablet (5 mg) by mouth every 6 hours as needed for pain  Quantity: 6 tablet  Refills: 0     senna-docusate 8.6-50 MG tablet  Commonly known as: SENOKOT-S/PERICOLACE  Used for: S/P  section      Dose: 1 tablet  Take 1 tablet by mouth daily Start after delivery.  Quantity: 100 tablet  Refills: 0            CONTINUE these medicines which have NOT CHANGED        Dose / Directions   albuterol 108 (90 Base) MCG/ACT inhaler  Commonly known as: PROAIR HFA/PROVENTIL HFA/VENTOLIN HFA  Used for: Intermittent asthma      Dose: 2 puff  Inhale 2 puffs into the lungs every 4 hours as needed for shortness of breath / dyspnea.  Quantity: 1 Inhaler  Refills: 1     guanFACINE 1 MG tablet  Commonly known as: TENEX      Dose: 1 mg  Take 1 mg by mouth at bedtime  Refills: 0     hydrOXYzine HCl 50 MG tablet  Commonly known as: ATARAX      Dose: 50 mg  Take 50 mg by mouth 3 times daily as needed for itching  Refills: 0     labetalol 100 MG tablet  Commonly known as: NORMODYNE      Dose: 100 mg  Take 100 mg by mouth 2 times daily  Refills: 0     NO ACTIVE MEDICATIONS      Refills: 0     omeprazole 20 MG tablet      Dose: 20 mg  Take 20 mg by mouth 2 times daily.  Refills: 0     venlafaxine 150 MG 24 hr tablet  Commonly known as: EFFEXOR-ER      Dose: 150 mg  Take 150 mg by mouth daily  Refills: 0            STOP taking      aspirin 81 MG EC tablet        doxylamine 25 MG Tabs  tablet  Commonly known as: UNISOM                  Where to get your medicines        These medications were sent to [facility] [place], [place] - [address]  [address], [facility] [address]      Phone: [phone]   acetaminophen 325 MG tablet  ibuprofen 600 MG tablet  oxyCODONE 5 MG tablet  senna-docusate 8.6-50 MG tablet            Discharge/Disposition:  Irina Alcantara was discharged to home in stable condition with the following instructions/medications:  1) Call for temperature > 100.4, bright red vaginal bleeding >1 pad an hour x 2 hours, foul smelling vaginal discharge, pain not controlled by usual oral pain meds, persistent nausea and vomiting not controlled on medications, drainage or redness from incision site  2) Bilateral salpingectomy completed  3) For feeding she decided to breast.  4) She was instructed to follow-up with her primary OB in 6 weeks for a routine postpartum visit and .  5) Discharge activity:  No lifting greater than 20 lbs, pushing, pulling, or other strenuous activity for 6 weeks. Pelvic rest for 6 weeks including no sexual intercourse, tampons, or douching. No driving until you can slam on the breaks without pain or while on narcotic pain medications.   6) TRACI Kulkarni DO, MA  [facility]- PGY2  7:55 AM March 16, 2024     I saw and evaluated this patient prior to discharge. I discussed the patient with the resident and agree with plan of care as documented in the resident note.   I personally reviewed vital signs, medications, labs.   I personally spent 15 minutes on discharge activities.  Radha Man MD

## 2024-03-15 LAB
ALBUMIN SERPL BCG-MCNC: 2.9 G/DL (ref 3.5–5.2)
ALBUMIN SERPL BCG-MCNC: 3.7 G/DL (ref 3.5–5.2)
ALP SERPL-CCNC: 151 U/L (ref 40–150)
ALP SERPL-CCNC: 97 U/L (ref 40–150)
ALT SERPL W P-5'-P-CCNC: 6 U/L (ref 0–50)
ALT SERPL W P-5'-P-CCNC: 9 U/L (ref 0–50)
ANION GAP SERPL CALCULATED.3IONS-SCNC: 17 MMOL/L (ref 7–15)
ANION GAP SERPL CALCULATED.3IONS-SCNC: 8 MMOL/L (ref 7–15)
AST SERPL W P-5'-P-CCNC: 20 U/L (ref 0–45)
AST SERPL W P-5'-P-CCNC: 25 U/L (ref 0–45)
BILIRUB SERPL-MCNC: 0.2 MG/DL
BILIRUB SERPL-MCNC: <0.2 MG/DL
BUN SERPL-MCNC: 11 MG/DL (ref 6–20)
BUN SERPL-MCNC: 5.8 MG/DL (ref 6–20)
CALCIUM SERPL-MCNC: 9.4 MG/DL (ref 8.6–10)
CALCIUM SERPL-MCNC: 9.8 MG/DL (ref 8.6–10)
CHLORIDE SERPL-SCNC: 101 MMOL/L (ref 98–107)
CHLORIDE SERPL-SCNC: 102 MMOL/L (ref 98–107)
CREAT SERPL-MCNC: 0.64 MG/DL (ref 0.51–0.95)
CREAT SERPL-MCNC: 0.72 MG/DL (ref 0.51–0.95)
DEPRECATED HCO3 PLAS-SCNC: 16 MMOL/L (ref 22–29)
DEPRECATED HCO3 PLAS-SCNC: 21 MMOL/L (ref 22–29)
EGFRCR SERPLBLD CKD-EPI 2021: >90 ML/MIN/1.73M2
EGFRCR SERPLBLD CKD-EPI 2021: >90 ML/MIN/1.73M2
ERYTHROCYTE [DISTWIDTH] IN BLOOD BY AUTOMATED COUNT: 13.8 % (ref 10–15)
GLUCOSE SERPL-MCNC: 88 MG/DL (ref 70–99)
GLUCOSE SERPL-MCNC: 94 MG/DL (ref 70–99)
HCT VFR BLD AUTO: 29.3 % (ref 35–47)
HGB BLD-MCNC: 9.2 G/DL (ref 11.7–15.7)
MCH RBC QN AUTO: 26.4 PG (ref 26.5–33)
MCHC RBC AUTO-ENTMCNC: 31.4 G/DL (ref 31.5–36.5)
MCV RBC AUTO: 84 FL (ref 78–100)
PLATELET # BLD AUTO: 241 10E3/UL (ref 150–450)
POTASSIUM SERPL-SCNC: 4.2 MMOL/L (ref 3.4–5.3)
POTASSIUM SERPL-SCNC: 4.6 MMOL/L (ref 3.4–5.3)
PROT SERPL-MCNC: 5.3 G/DL (ref 6.4–8.3)
PROT SERPL-MCNC: 6.8 G/DL (ref 6.4–8.3)
RBC # BLD AUTO: 3.49 10E6/UL (ref 3.8–5.2)
SODIUM SERPL-SCNC: 131 MMOL/L (ref 135–145)
SODIUM SERPL-SCNC: 134 MMOL/L (ref 135–145)
WBC # BLD AUTO: 15.6 10E3/UL (ref 4–11)

## 2024-03-15 PROCEDURE — 250N000013 HC RX MED GY IP 250 OP 250 PS 637

## 2024-03-15 PROCEDURE — 36415 COLL VENOUS BLD VENIPUNCTURE: CPT

## 2024-03-15 PROCEDURE — 120N000002 HC R&B MED SURG/OB UMMC

## 2024-03-15 PROCEDURE — 250N000013 HC RX MED GY IP 250 OP 250 PS 637: Performed by: STUDENT IN AN ORGANIZED HEALTH CARE EDUCATION/TRAINING PROGRAM

## 2024-03-15 PROCEDURE — 80053 COMPREHEN METABOLIC PANEL: CPT

## 2024-03-15 PROCEDURE — 250N000011 HC RX IP 250 OP 636: Performed by: STUDENT IN AN ORGANIZED HEALTH CARE EDUCATION/TRAINING PROGRAM

## 2024-03-15 PROCEDURE — 85027 COMPLETE CBC AUTOMATED: CPT

## 2024-03-15 PROCEDURE — 99232 SBSQ HOSP IP/OBS MODERATE 35: CPT | Mod: GC | Performed by: STUDENT IN AN ORGANIZED HEALTH CARE EDUCATION/TRAINING PROGRAM

## 2024-03-15 RX ORDER — HYDROXYZINE HYDROCHLORIDE 25 MG/1
25-50 TABLET, FILM COATED ORAL 3 TIMES DAILY PRN
Status: DISCONTINUED | OUTPATIENT
Start: 2024-03-15 | End: 2024-03-16 | Stop reason: HOSPADM

## 2024-03-15 RX ORDER — ENOXAPARIN SODIUM 100 MG/ML
40 INJECTION SUBCUTANEOUS EVERY 24 HOURS
Status: DISCONTINUED | OUTPATIENT
Start: 2024-03-15 | End: 2024-03-15

## 2024-03-15 RX ADMIN — HYDROXYZINE HYDROCHLORIDE 50 MG: 25 TABLET, FILM COATED ORAL at 00:13

## 2024-03-15 RX ADMIN — ENOXAPARIN SODIUM 40 MG: 40 INJECTION SUBCUTANEOUS at 11:29

## 2024-03-15 RX ADMIN — OXYCODONE HYDROCHLORIDE 5 MG: 5 TABLET ORAL at 15:43

## 2024-03-15 RX ADMIN — GUANFACINE 1 MG: 1 TABLET ORAL at 21:32

## 2024-03-15 RX ADMIN — LABETALOL HYDROCHLORIDE 100 MG: 100 TABLET, FILM COATED ORAL at 21:32

## 2024-03-15 RX ADMIN — OXYCODONE HYDROCHLORIDE 5 MG: 5 TABLET ORAL at 00:13

## 2024-03-15 RX ADMIN — IBUPROFEN 800 MG: 800 TABLET, FILM COATED ORAL at 17:56

## 2024-03-15 RX ADMIN — ACETAMINOPHEN 975 MG: 325 TABLET, FILM COATED ORAL at 00:13

## 2024-03-15 RX ADMIN — ACETAMINOPHEN 975 MG: 325 TABLET, FILM COATED ORAL at 17:56

## 2024-03-15 RX ADMIN — HYDROXYZINE HYDROCHLORIDE 25 MG: 25 TABLET, FILM COATED ORAL at 09:19

## 2024-03-15 RX ADMIN — ACETAMINOPHEN 975 MG: 325 TABLET, FILM COATED ORAL at 05:25

## 2024-03-15 RX ADMIN — DOCUSATE SODIUM AND SENNOSIDES 2 TABLET: 8.6; 5 TABLET, FILM COATED ORAL at 21:31

## 2024-03-15 RX ADMIN — OMEPRAZOLE 40 MG: 20 CAPSULE, DELAYED RELEASE ORAL at 21:32

## 2024-03-15 RX ADMIN — VENLAFAXINE 150 MG: 150 TABLET, EXTENDED RELEASE ORAL at 21:32

## 2024-03-15 RX ADMIN — OXYCODONE HYDROCHLORIDE 5 MG: 5 TABLET ORAL at 21:42

## 2024-03-15 RX ADMIN — IBUPROFEN 800 MG: 800 TABLET, FILM COATED ORAL at 05:25

## 2024-03-15 RX ADMIN — IBUPROFEN 800 MG: 800 TABLET, FILM COATED ORAL at 11:29

## 2024-03-15 RX ADMIN — ACETAMINOPHEN 975 MG: 325 TABLET, FILM COATED ORAL at 11:29

## 2024-03-15 RX ADMIN — LABETALOL HYDROCHLORIDE 100 MG: 100 TABLET, FILM COATED ORAL at 09:07

## 2024-03-15 RX ADMIN — DOCUSATE SODIUM 50 MG AND SENNOSIDES 8.6 MG 1 TABLET: 8.6; 5 TABLET, FILM COATED ORAL at 07:45

## 2024-03-15 RX ADMIN — OXYCODONE HYDROCHLORIDE 5 MG: 5 TABLET ORAL at 03:46

## 2024-03-15 ASSESSMENT — ACTIVITIES OF DAILY LIVING (ADL)
ADLS_ACUITY_SCORE: 25
ADLS_ACUITY_SCORE: 20
ADLS_ACUITY_SCORE: 20
ADLS_ACUITY_SCORE: 25
ADLS_ACUITY_SCORE: 25
ADLS_ACUITY_SCORE: 20
ADLS_ACUITY_SCORE: 25
ADLS_ACUITY_SCORE: 20
ADLS_ACUITY_SCORE: 20
ADLS_ACUITY_SCORE: 25
ADLS_ACUITY_SCORE: 25
ADLS_ACUITY_SCORE: 20
ADLS_ACUITY_SCORE: 25
ADLS_ACUITY_SCORE: 20
ADLS_ACUITY_SCORE: 20
ADLS_ACUITY_SCORE: 25
ADLS_ACUITY_SCORE: 20
ADLS_ACUITY_SCORE: 25
ADLS_ACUITY_SCORE: 20
ADLS_ACUITY_SCORE: 25

## 2024-03-15 NOTE — PROGRESS NOTES
"Post Epidural Anesthesia Follow Up Note    Patient: Irina Alcantara    Patient location: Postpartum floor.      Procedure(s) Performed:  Procedure(s):   section, tubal ligation, combined    Anesthesia type: Epidural    Subjective:     Pain is well controlled     Additional ROS:  She does not complain of pruritis at this time. She denies weakness, denies paresthesia, denies difficulties breathing or voiding, denies nausea or vomiting. She is able to ambulate and tolerates regular diet.    Objective:  Vitals stable    Last Vitals: /70   Pulse 87   Temp 36.6  C (97.8  F) (Oral)   Resp 18   Ht 1.702 m (5' 7\")   Wt 135.2 kg (298 lb)   LMP 06/15/2023 (Approximate)   SpO2 100%   Breastfeeding Unknown   BMI 46.67 kg/m      Assessment and plan:   Irina Alcantara is a 33 year old female  POD #1 s/p labor epidural placement. There is no evidence of adverse side effects associated with procedure. Pain is well controlled.    Thank you for including us in the care for this patient. Please call with additional questions or concerns.     Meliton Choudhury MD  Anesthesiology Resident CA-2/-PGY-4    "

## 2024-03-15 NOTE — PLAN OF CARE
Goal Outcome Evaluation:      Plan of Care Reviewed With: patient    Overall Patient Progress: improvingOverall Patient Progress: improving      Outcome Evaluation: pt pain and mobility is improving. Staying on top of pain medications, pt reports good pain management, lochia wnl, incision dressing clean dry and intact, VSS. HR rate wnl, denies all s/s of pre-e. Not has no s/s of N/V over night. Márquez removed at 0530. Pt up moving well, independently on feet. Breastfeeding infant and doing some donor milk. Pt comfortable with infant cares.  Support person present. No concerns.

## 2024-03-15 NOTE — PROGRESS NOTES
Obstetrics Postpartum Progress Note  DOS: 24  MRN: [id number]    POD#1 after RLTCS, BS    S: Patient states she is doing well.  Pain well controlled with current pain meds. Lochia wnl.  Eating and drinking without nausea/vomiting.  She is not passing flatus yet.  Ambulating without difficulty.  Voiding without difficulty.  Denies headaches, vision changes, chest pain, SOB.  Breastfeeding with some difficulty. No other acute concerns.  O:  Vitals:    24 2053 24 2100 03/15/24 0050 03/15/24 0525   BP: 128/78 128/80 112/79 118/72   BP Location:  Left arm Left arm Left arm   Patient Position:   Semi-Tejada's Semi-Tejada's   Cuff Size:   Adult Large Adult Large   Pulse:  107 86 94   Resp:  18 18    Temp:  98.2  F (36.8  C) 97.9  F (36.6  C)    TempSrc:  Oral Oral    SpO2:  100%     Weight:       Height:             Gen:  NAD,   CV: Regular rate, well perfused  Resp: Nonlabored on room air, normal inspiratory effort  Abd: soft, nondistended, nontender, fundus firm at  below umbilicus  Incision:  clean, dry, intact, ABD in place  Ext: mild edema      Intake/Output Summary (Last 24 hours) at 3/15/2024 0640  Last data filed at 3/15/2024 0530  Gross per 24 hour   Intake 2925 ml   Output 1295 ml   Net 1630 ml           Weight:   Vitals:    24 0904   Weight: 135.2 kg (298 lb)         Labs:  Hemoglobin   Date Value Ref Range Status   2024 11.3 (L) 11.7 - 15.7 g/dL Final   2024 11.0 (L) 11.7 - 15.7 g/dL Final   2011 13.9 11.7 - 15.7 g/dL Final         Assessment and Plan: 33 year old  POD#1 s/p RLTCS BS, doing well postpartum. Pregnancy was complicated by CHTN, HELEN/MDD and severe polyhydramnios    #Anxiety/depression  - PTA venlafaxine and guanfacine  - Follows closely with therapist twice a month  - Mood is stable and manageable at this time     #cHTN  - Serial BP monitoring   - IV Antihypertensives prn for sustained severe range blood pressures (>160/>110)  - Continue PTA  labetalol 100mg BID  - Labs: HELLP labs on admission, nl, repeat pending this AM     #GERD  - PTA Omeprazole 20mg BID     #Asthma     #Tachycardia  - s/p Ziopatch x2 weeks: min heart rate of 63 bpm, max of 180 bpm, average 95 bpm. No abnormalities noted in preliminary reading.  - Normal TSH of 2.31 on 12/8    Routine postpartum:  Heme: Hgb 11.3 >  > AM pending. No symptoms of ABLA. Will discharge home w/ PO iron if Hgb under 10.  Pain: well-controlled with tylenol, ibuprofen and oxycodone prn, continue.  Rh pos/Rubella immune. No intervention required.  Feeding: Breast and pumping  :  s/p patel, voiding  PPX: Encourage ambulation, Lovenox  Continue regular diet, scheduled bowel regimen, prn antiemetics  Contraception: BS    Dispo: Anticipate discharge home POD#2-3    Alverto Kulkarni DO, MA  [facility]- PGY2  6:41 AM March 15, 2024     I have seen and examined the patient I have reviewed and agree with the note. POD#1 s/p RCS. Doing well. Plan discharge on POD#2-3 with [facility].    Nicolette Alexander MD

## 2024-03-15 NOTE — PLAN OF CARE
Goal Outcome Evaluation: Vital signs stable. Postpartum assessment WDL. Incision WDL. Pain controlled with tylenol and toradol. Patient stood at side of bed, fatigues quickly. Márquez remains in place for low urine output and lightheaded/fatigue with standing. Patient denies passing gas. Breastfeeding on cue with minimal assist. Patient and infant bonding well. Plan of care ongoing.       Plan of Care Reviewed With: patient, spouse    Overall Patient Progress: improvingOverall Patient Progress: improving           Problem: Adult Inpatient Plan of Care  Goal: Patient-Specific Goal (Individualized)    Outcome: Progressing  Flowsheets (Taken 3/14/2024 2247)  Individualized Care Needs: Provide patient with a detailed schedule for assessments and pain management. Offer support with feeding times.  Anxieties, Fears or Concerns: Generalized anxiety about schedule, plan of care, waking to feed .  Patient/Family-Specific Goals (Include Timeframe): Patient will gain confidence and independence with feeding and self care prior to discharge.     Problem: Adult Inpatient Plan of Care  Goal: Optimal Comfort and Wellbeing  Outcome: Progressing  Intervention: Monitor Pain and Promote Comfort  Recent Flowsheet Documentation  Taken 3/14/2024 1830 by Clau Bhatt RN  Pain Management Interventions:   medication (see MAR)   care clustered  Intervention: Provide Person-Centered Care  Recent Flowsheet Documentation  Taken 3/14/2024 1621 by Clau Bhatt RN  Trust Relationship/Rapport:   care explained   choices provided   empathic listening provided   questions answered   questions encouraged   reassurance provided   thoughts/feelings acknowledged

## 2024-03-15 NOTE — PROVIDER NOTIFICATION
03/14/24 2029   Provider Notification   Provider Name/Title dr. escoto   Method of Notification Electronic Page   Request Evaluate-Remote   Notification Reason Medication Request  (soemthing PRN for anxiety please)

## 2024-03-15 NOTE — LACTATION NOTE
"This note was copied from a baby's chart.  Consult for: Early Term Infant. Parents have questions on how to get infant on a feeding routine that matches their home routine.     Infant Name: Clau    Infant's Primary Care Clinic: [facility]    Delivery Information:  Clau was born at 37w0d via   delivery on 3/14/2024 12:33 PM     Maternal Health History:    Information for the patient's mother:  Irina Alcantara [[id number]]     Patient Active Problem List   Diagnosis    CARDIOVASCULAR SCREENING; LDL GOAL LESS THAN 160    GERD (gastroesophageal reflux disease)    Intermittent asthma    Chronic hypertension in pregnancy    Gastroesophageal reflux disease without esophagitis    Depression, unspecified depression type    Mild intermittent asthma, unspecified whether complicated      Irina takes Prilosec for GERD along with Effexor and Tenex for Depression. Meds reviewed in  [facility] Mothers' Milk.    Prilosec: L2-Limited Data- Probably Compatible. Kat endorses that medication is \"unlikely to be absorbed while dissolved in milk due to instability in acid\".    Effexor: L2-Limited Data-Probably Compatible. Stephens recommends monitoring infant for sedation or irritability, not waking to feed/poor feeding, and weight gain.     Tenex: L3-No Data-Probably Compatible. Stephens recommends monitoring infant for sedation, weakness and hypotension.   ?  Maternal Breast Exam:  Irina noted some breast growth and sensitivity in early pregnancy. She denies any history of breast/chest injury or surgery. She has been able to hand express and pump small amounts of colostrum. She is using her hand pump as she prefers this to her electric pump.     Breastfeeding/ Lactation History: Irina shares that after having gall stones and a traumatic birth with her son she produced \"very little\" milk.     Infant information: Clau has age appropriate output and weight loss.      Weight Change Since Birth: -5% at 1 " day old     Feeding History: Irina shares she has latched Clau a few times and denies concerns. She shares she plans to do a combination of breastfeeding, pumping and bottle feeding her ebm and/or donor milk (will use formula at home). She has mostly been bottle feeding donor milk today but understands latching support is available.     Irina shares she has been doing some hand expression of colostrum as well as using her manual pump for breast stimulation.    Feeding Assessment: Irina declines latch support at this time as she plans to bottle feed donor milk only at this feeding. She was encouraged to call for support as needed.      Education:   [x] Potential feeding challenges of early term infants  [] Expected  feeding patterns in the first few days (pg. 38 of Your Guide to To Postpartum and  Care)/ the Second Night  [x] Stages of milk production  [x] Benefits of hand expression of colostrum  [] Early feeding cues   [x] Feeding frequency- encourage at least every 3 hours.     [] Benefits of feeding on cue  [x] Benefits of skin to skin  [] Breastfeeding positions  [] Tips to get and maintain a deep latch  [] Nutritive vs.non-nutritive sucking  [] Gentle breast compressions as needed to enhance milk transfer  [x] How to tell when baby is finished  [x] How to tell if baby is getting enough  [x] Expected  output  [x] [place] weight loss  [x] Infant Feeding Log  [x] [facility] Breastfeeding/Pumping videos  [] Signs breastfeeding is going well (comfortable latch, audible swallows, age appropriate output and weight loss)    [] Tips to prevent engorgement  [] Signs of engorgement  [] Tips to manage engorgement  [x] Hand expression and pumping recommendations  [] Supplement recommendations   [] Satiety cues   [x] [facility] breast pump part/infant feeding supplies cleaning recommendations  [x] Inpatient breastfeeding support  [x] Outpatient lactation resources and follow up  "recommendations    Handouts: Infant Feeding Log (Week 1, Your Guide to Postpartum & [place] Care Book) and [facility] Lactation Resources    Home Breast Pump: Irina has both a manual and electric breast pump. She prefers not to use the electric pump at this time.      Plan (Early Term): Irina plans to do a combination of both breastfeeding and supplementing via bottle. She has mostly bottle fed donor milk today. Encouraged her to go no longer than 3 hours between feedings. Encouraged her to breastfeed as often as she is able so Clau gets time to practice at the breast.     Encourage hand expression after feedings until milk is in, and hands on pumping 4-6 times daily to support \"full term\" supply. Supplement after breastfeeding with any expressed milk. Ideally will pump each time infant is bottle fed and does not go to the breast.     Irina expressed a desire to get infant on the sleep/awake schedule their family follows at home. Reviewed the need for around the clock feeding with newborns and encouraged her to not go longer than 3 hours between feedings.     Follow up with outpatient lactation consultant within a week of discharge for support with early term infant. Family plans to follow up with [facility]. They were also given the [facility] Lactation Resource Handout.       Kaitlin Jauregui RN, IBCLC   Lactation Consultant  [facility]: [facility] [phone]  Office: [phone]          "

## 2024-03-16 VITALS
SYSTOLIC BLOOD PRESSURE: 116 MMHG | OXYGEN SATURATION: 100 % | HEART RATE: 89 BPM | HEIGHT: 67 IN | DIASTOLIC BLOOD PRESSURE: 75 MMHG | BODY MASS INDEX: 45.99 KG/M2 | TEMPERATURE: 97.9 F | WEIGHT: 293 LBS | RESPIRATION RATE: 16 BRPM

## 2024-03-16 PROBLEM — O10.919 CHRONIC HYPERTENSION AFFECTING PREGNANCY: Status: ACTIVE | Noted: 2024-03-16

## 2024-03-16 PROCEDURE — 250N000011 HC RX IP 250 OP 636: Performed by: STUDENT IN AN ORGANIZED HEALTH CARE EDUCATION/TRAINING PROGRAM

## 2024-03-16 PROCEDURE — 250N000013 HC RX MED GY IP 250 OP 250 PS 637: Performed by: STUDENT IN AN ORGANIZED HEALTH CARE EDUCATION/TRAINING PROGRAM

## 2024-03-16 PROCEDURE — 250N000013 HC RX MED GY IP 250 OP 250 PS 637

## 2024-03-16 PROCEDURE — 99238 HOSP IP/OBS DSCHRG MGMT 30/<: CPT | Mod: GC | Performed by: OBSTETRICS & GYNECOLOGY

## 2024-03-16 RX ORDER — OXYCODONE HYDROCHLORIDE 5 MG/1
5 TABLET ORAL EVERY 6 HOURS PRN
Qty: 6 TABLET | Refills: 0 | Status: SHIPPED | OUTPATIENT
Start: 2024-03-16 | End: 2024-03-19

## 2024-03-16 RX ORDER — AMOXICILLIN 250 MG
1 CAPSULE ORAL DAILY
Qty: 100 TABLET | Refills: 0 | Status: SHIPPED | OUTPATIENT
Start: 2024-03-16

## 2024-03-16 RX ORDER — IBUPROFEN 600 MG/1
600 TABLET, FILM COATED ORAL EVERY 6 HOURS PRN
Qty: 60 TABLET | Refills: 0 | Status: SHIPPED | OUTPATIENT
Start: 2024-03-16

## 2024-03-16 RX ORDER — ACETAMINOPHEN 325 MG/1
650 TABLET ORAL EVERY 6 HOURS PRN
Qty: 100 TABLET | Refills: 0 | Status: SHIPPED | OUTPATIENT
Start: 2024-03-16

## 2024-03-16 RX ADMIN — OXYCODONE HYDROCHLORIDE 5 MG: 5 TABLET ORAL at 01:54

## 2024-03-16 RX ADMIN — ACETAMINOPHEN 975 MG: 325 TABLET, FILM COATED ORAL at 06:40

## 2024-03-16 RX ADMIN — ENOXAPARIN SODIUM 40 MG: 40 INJECTION SUBCUTANEOUS at 00:05

## 2024-03-16 RX ADMIN — LABETALOL HYDROCHLORIDE 100 MG: 100 TABLET, FILM COATED ORAL at 08:49

## 2024-03-16 RX ADMIN — ACETAMINOPHEN 975 MG: 325 TABLET, FILM COATED ORAL at 13:14

## 2024-03-16 RX ADMIN — IBUPROFEN 800 MG: 800 TABLET, FILM COATED ORAL at 13:14

## 2024-03-16 RX ADMIN — ACETAMINOPHEN 975 MG: 325 TABLET, FILM COATED ORAL at 00:05

## 2024-03-16 RX ADMIN — DOCUSATE SODIUM AND SENNOSIDES 2 TABLET: 8.6; 5 TABLET, FILM COATED ORAL at 08:00

## 2024-03-16 RX ADMIN — IBUPROFEN 800 MG: 800 TABLET, FILM COATED ORAL at 06:40

## 2024-03-16 RX ADMIN — IBUPROFEN 800 MG: 800 TABLET, FILM COATED ORAL at 00:05

## 2024-03-16 RX ADMIN — HYDROXYZINE HYDROCHLORIDE 50 MG: 25 TABLET, FILM COATED ORAL at 00:05

## 2024-03-16 ASSESSMENT — ACTIVITIES OF DAILY LIVING (ADL)
ADLS_ACUITY_SCORE: 20

## 2024-03-16 NOTE — PLAN OF CARE
Goal Outcome Evaluation:  Data: Vital signs within normal limits. Postpartum checks within normal limits - see flow record. Patient eating and drinking normally. Patient able to empty bladder independently and is up ambulating. No apparent signs of infection. C/S dressing is clean, dry, and intact. Patient currently in shower. Patient performing self cares and is able to care for infant. Offered to assist patient with breastfeeding, patient declines assistance, bottle feeding baby donor breast milk and expressed breast milk from hand pump.   Action: Pain has been adequately managed with oral medications, pt reporting more pain with getting up and out of bed.   Response: Positive attachment behaviors observed with infant. Support person, : all Muller.   Plan: Continue with the plan of care.

## 2024-03-16 NOTE — CONSULTS
"Social Work Initial Consult    DATA/ASSESSMENT    General Information  Assessment completed with: Patient, Irina  Type of visit: Psychosocial Assessment      Reason for Consult:      Living Environment:   Primary caregiver:  mom and dad  Lives with:     , son      Able to return to prior arrangements:  yes       Family Factors  Family Risk Factors: maternal mental health history or concerns  Family Strength Factors: able and willing to advocate for self/family, able and willing to ask for help/accept help, connected with mental health support, demonstrated ability to integrate new information actively seeking resources, demonstrated commitment to being present and engaged in cares, experienced parents, local family, parental employment    Assessment of Support  Parental Marital Status:   Who is your support system?: Grandparent(s) Description of Support System: Supportive, Involved       Coping/Stress  Irina had a high ED score and a SW consult was placed. She reports being connected to a psychiatrist prescribing medication that has been helpful and regularly sees a therapist. She will see her therapist again the week after Easter. She is learning to live and cope with what her baseline is, and she has strong support in her , Renzo, and her mother.           She reports that her pregnancy was made difficult with some medical complications and it was hard to see her  feeling stressed. However, now that she's given birth and feels more physically capable, she is motivated to return to her \"normal\" baseline. This is also her 2nd child so she feels more prepared. She is worried about her anxiety because it's the \"new mother stage\" again, but she knows how to seek and access her mental health supports.     Additional Information  Her  is able to take 2 weeks of paternity leave. His job provides good benefits, and they are very happy with this support.      INTERVENTION    Conducted " chart review and consulted with medical team regarding plan of care. Introduced SW role and scope of practice.     Provided assessment of patient and family's level of coping  Provided psychosocial supportive counseling and crisis intervention  Provided solution-focused therapeutic support  Validated emotions and provided supportive listening    SW met with Irina and her baby was sleeping in the room. They are discharging very soon and her  Renzo was bringing up the infant carseat/carrier. They declined further SW resources.     PLAN    No further needs identified; please page SW if further needs arise.     RODOLFO Ochoa, [facility]  Pediatric Social Worker  [email or web address]  After Hours and Weekend Pager: [phone]  *NO LETTER*

## 2024-03-16 NOTE — LACTATION NOTE
This note was copied from a baby's chart.  Follow Up Consult    Maternal Assessment: Irina shares she got some sleep last night but still feels tired today. She believes her breasts feel heavier today.       Infant Assessment:  Clau has age appropriate output and weight loss.      Weight Change Since Birth: -8.7% at 2 day old      Feeding History: Irina bottle fed donor milk overnight. She plans to attempt some latching today but declines assistance with this now. She plans to do this at home.    Irina has been pumping with her manual pump when she has free time throughout the day. She has an electric breast pump as well but does not want to use at this time as she prefers the manual pump.       Education:   [] Expected  feeding patterns in the first few days (pg. 38 of Your Guide to To Postpartum and [place] Care)/ the Second Night  [x] Stages of milk production  [x] Benefits of hand expression of colostrum  [] Early feeding cues     [] Benefits of feeding on cue  [] Benefits of skin to skin  [] Breastfeeding positions  [] Tips to get and maintain a deep latch  [] Nutritive vs.non-nutritive sucking  [] Gentle breast compressions as needed to enhance milk transfer  [] How to tell when baby is finished  [] How to tell if baby is getting enough  [] Expected  output  [x]  weight loss  [] Infant Feeding Log  [x] Feeding Frequency  [] Signs breastfeeding is going well (comfortable latch, audible swallows, age appropriate output and weight loss)    [x] Tips to prevent engorgement  [x] Signs of engorgement  [x] Tips to manage engorgement  [x] Pumping recommendations   [x] [facility] breast pump part/infant feeding supplies cleaning recommendations  [] Inpatient breastfeeding support  [x] Outpatient lactation resources    Home Breast Pump: Has manual and electric pumps to use at home.     Plan: Discharging to home today. Irina plans to continue bottle feeding at home and plans to use any ebm she  gets and formula. She plans to latch at home as well.     Encouraged feeding every 3 hours due to early term infant.        Encouraged pumping with feedings, especially when Calu does not go to the breast.    Follow up with outpatient lactation consultant within a week of discharge for support with early term infant. Family plans to follow up with [facility]. They were also given the [facility] Lactation Resource Handout.         Kaitlin Jauregui RN, IBCLC   Lactation Consultant  [place]: [facility] [phone]  Office: [phone]

## 2024-03-16 NOTE — PLAN OF CARE
Goal Outcome Evaluation:  VSS. Postpartum assessment WNL. C/o some incisional pain, well managed with tylenol and ibuprofen. EDS of 15, social work consult entered and pt seen by CHRIS. Pt states she has a breast pump at home. Discharge wt obtained. Reviewed AVS and discharge medications. Pt will enter [facility] program at discharge, has BP cuff at home. Pt to discharge to home with spouse and baby.

## 2024-03-16 NOTE — DISCHARGE INSTRUCTIONS
Warning Signs after Having a Baby    Keep this paper on your fridge or somewhere else where you can see it.    Call your provider if you have any of these symptoms up to 12 weeks after having your baby.    Thoughts of hurting yourself or your baby  Pain in your chest or trouble breathing  Severe headache not helped by pain medicine  Eyesight concerns (blurry vision, seeing spots or flashes of light, other changes to eyesight)  Fainting, shaking or other signs of a seizure    Call 9-1-1 if you feel that it is an emergency.     The symptoms below can happen to anyone after giving birth. They can be very serious. Call your provider if you have any of these warning signs.    My provider s phone number: _______________________    Losing too much blood (hemorrhage)    Call your provider if you soak through a pad in less than an hour or pass blood clots bigger than a golf ball. These may be signs that you are bleeding too much.    Blood clots in the legs or lungs    After you give birth, your body naturally clots its blood to help prevent blood loss. Sometimes this increased clotting can happen in other areas of the body, like the legs or lungs. This can block your blood flow and be very dangerous.     Call your provider if you:  Have a red, swollen spot on the back of your leg that is warm or painful when you touch it.   Are coughing up blood.     Infection    Call your provider if you have any of these symptoms:  Fever of 100.4 F (38 C) or higher.  Pain or redness around your stitches if you had an incision.   Any yellow, white, or green fluid coming from places where you had stitches or surgery.    Mood Problems (postpartum depression)    Many people feel sad or have mood changes after having a baby. But for some people, these mood swings are worse.     Call your provider right away if you feel so anxious or nervous that you can't care for yourself or your baby.    Preeclampsia (high blood pressure)    Even if you  "didn't have high blood pressure when you were pregnant, you are at risk for the high blood pressure disease called preeclampsia. This risk can last up to 12 weeks after giving birth.     Call your provider if you have:   Pain on your right side under your rib cage  Sudden swelling in the hands and face    Remember: You know your body. If something doesn't feel right, get medical help.     For informational purposes only. Not to replace the advice of your health care provider. Copyright 2020 [employer] [facility]. All rights reserved. Clinically reviewed by Brittney Quintero, RNC-OB, MSN. [employer] 751710 - Rev 02/23.    Know Your Blood Pressure Numbers  For patients who've had a high blood pressure disorder of pregnancy  What to know about high blood pressure disorders of pregnancy  People who had high blood pressure during pregnancy may continue to have high blood pressure for up to 12 weeks after pregnancy. It can also raise your lifetime risk of chronic high blood pressure, heart disease and blood vessel disease. It is vital that you keep monitoring your blood pressure and taking steps to control it.   The general guidelines below are what your blood pressures mean.  A heart healthy lifestyle that includes blood pressure control can help reduce these risks. A good blood pressure goal is less than 130/80 mmHg long-term.  Talk to your provider about high blood pressure disorder of pregnancy and what this means for your lifelong health.   Know your numbers  Read \"Checking Your Blood Pressure at Home\" to learn the best way to take your blood pressure.  Refer to the next page for general guidelines about what your blood pressures mean and what to do about them. Follow these instructions unless your provider tells you something different.  For more resources, visit www.preeclampsia.org.  What to do if your blood pressure is high  Act right away if you have numbers in the yellow or red range--don't wait for a " "scheduled appointment.  When to call your provider  Regardless of your blood pressure, call your healthcare provider right away if you develop any of these symptoms:  Severe headache  Chest pain  Trouble breathing  Stomach pain  Changes in vision  Swelling in your hands and face.  Please say, \"I am having symptoms of high blood pressure. My provider told me to call and ask to be seen right away when I have these symptoms.\"  If you ARE pregnant OR   it has been less than 12 weeks since you delivered  Systolic pressure (top number) is....  Diastolic (bottom number) is.... Your blood pressure is....   160 or higher  or 110 or higher VERY HIGH. Check it again in 10 minutes, then contact your provider.   140 - 159  or 90 - 109 HIGH. Keep checking blood pressure 2 times a day. If your blood pressure is in this range for 2 readings, contact your provider within 24 hours. We will discuss starting or increasing your blood pressure medicine.   100 - 139  and 60 - 89 NORMAL. Your blood pressure looks great!   Keep checking it 2 times a day.   Less than 100  or Less than 60 LOW. Check your blood pressure again in   10 minutes, then contact your provider. We may need to make changes to your blood pressure medicine.     Call your provider right away if you have these symptoms: a severe headache, vision changes, shortness of breath, chest pain, or right upper belly pain. Call even if your blood pressure is okay.  Call 9-1-1 if you feel the symptoms are severe and that it is an emergency.   If you are NOT pregnant OR   it has been more than 12 weeks since you delivered  Systolic pressure (top number) is....  Diastolic (bottom number) is.... Your blood pressure is....   Higher than 180 and/or Higher than 120 Hypertensive crisis. Call your doctor right away.   140 or higher or  90 or higher Hypertension Stage 2. Follow up with your provider.   130-139 or Less than 80 Hypertension Stage 1. Follow up with your provider.   120-129 and " Less than 80 Elevated blood pressure (pre-hypertension). You are at higher risk of developing high blood pressure. Follow up with your provider.   Less than 120 and Less than 80 Normal.     Call your provider right away if you have these symptoms: a severe headache, vision changes, shortness of breath, chest pain, or right upper belly pain. Call even if your blood pressure is okay.  Call 9-1-1 if you feel the symptoms are severe and that it is an emergency.   For informational purposes only. Not to replace the advice of your health care provider. Copyright   2023 [employer]. All rights reserved. Clinically reviewed by [facility]. [employer] [id number] - 03/23.    Checking Your Blood Pressure at Home  During and after pregnancy  How do I measure my blood pressure?  It s important to take the readings at the same time each day, such as morning and evening. Take your blood pressure before taking any morning medications.  How to get the most accurate reading  30 minutes before checking your blood pressure, avoid the following:  Drinking caffeine  Drinking alcohol  Eating  Smoking  Exercising  5 minutes before checking your blood pressure:  Use the bathroom and urinate so you have an empty bladder.  Sit still in a chair for around 5 minutes. Stay calm and relaxed and do not talk if possible.  To check your blood pressure:     Sit up straight in a chair.  Place your feet on the floor. Don t cross your ankles or legs.  Rest your arm at the level of your heart on a table or desk or on the arm of a chair. Use the same arm every day.  Pull up your shirt sleeve. Don t take the measurement over clothes.  Wrap the blood pressure cuff around the upper part of your left arm, 1 inch (2.5 cm) above your elbow.  Fit the cuff snugly around your arm. You should be able to place only one finger between the cuff and your arm.  Position the cord so that it rests in the bend of your elbow.  Press the power  button.  Sit quietly while the cuff inflates and deflates.  Read the digital reading on the monitor screen and write the numbers down (record them) in a notebook.  Wait 2-3 minutes, then repeat the steps, starting at step 1.  Which features do you need?  Arm cuff monitors give the most exact readings.  Wrist and finger blood pressure monitors are often less exact.  Pick a blood pressure monitor that has passed tests to show they measure exactly. Blood pressure cuffs for sale in the [place] that have passed tests are listed on the website www.validatebp.org.  Some monitors that have passed tests are:  Omron 3 Series Upper Arm Blood Pressure Monitor (Model FZ9559)  Omron 5 Series Upper Arm Blood Pressure Monitor (Model GS8290)  Omron 7 Series Upper Arm Blood Pressure Monitor (Model HEM-7320)  [employer] Medical Upper Arm Blood Pressure Monitor with Talking Function (UA 1030T)  Don t use smartphone apps. There are many smartphone apps that claim to check your blood pressure using the pulse in your wrist or finger. These don t work. They haven t passed any tests. Don t give your clinic a blood pressure reading from a smartphone basilia.  If you have a flexible spending account (FSA) or health savings account (HSA), you may wish to pay yourself back (reimburse) for the machine and cuff. A blood pressure monitor is an allowed over-the- counter (OTC) item to pay yourself back from these accounts.  Cuff size  The size of the arm cuff is a key feature. Make sure the cuff is the right size for your arm. If the cuff isn t the right size, readings will either be too high or low.  To know what size cuff to buy, measure the distance around your bicep (upper arm).  Use a flexible measuring tape or . Place the measuring tape FPC between your armpit and elbow. Measure the distance around your arm in inches.  You may need to buy a cuff apart from the machine to get the right size.  Cuff sizes and arm measurements  Small adult: 22 to  "26 cm (8.7 to 10.2 inches)  Adult: 27 to 34 cm (10.6 to 13.4 inches)  Large adult: 35 to 44 cm (13.8 to 17.3 inches)  Adult thigh: 45 to 52 cm (17.7 to 20.5 inches)    Copyright statement\" content=\"For informational purposes only. Not to replace the advice of your health care provider. Photo: ID [id number]   [employer]. Text copyright    [facility]. All rights reserved. Clinically reviewed by [facility]. [employer] [id number] - REV .    After Your Delivery (the Postpartum Period): Care Instructions  Overview     After childbirth (postpartum period), your body goes through many changes as you recover. In these weeks after delivery, try to take good care of yourself. Get rest whenever you can and accept help from others.  It may take 4 to 6 weeks to feel like yourself again, and possibly longer if you had a  birth. You may feel sore or very tired as you recover. After delivery, you may continue to have contractions as the uterus returns to the size it was before your pregnancy. You will also have some vaginal bleeding. And you may have pain around the vagina as you heal. Several days after delivery you may also have pain and swelling in your breasts as they fill with milk. There are things you can do at home to help ease these discomforts.  After childbirth, it's common to feel emotional. You may feel irritable, cry easily, and feel happy one minute and sad the next. This is called the \"baby blues.\" Hormone changes are one cause of these emotional changes. These feelings usually get better within a couple of weeks. If they don't, talk to your doctor or midwife.  In the first couple of weeks after you give birth, your doctor or midwife may want to check in with you and make a plan for follow-up care. You will likely have a complete postpartum visit in the first 3 months after delivery. At that time, your doctor or midwife will check on your recovery " and see how you're doing. But if you have questions or concerns before then, you can always call your doctor or midwife.  Follow-up care is a key part of your treatment and safety. Be sure to make and go to all appointments, and call your doctor if you are having problems. It's also a good idea to know your test results and keep a list of the medicines you take.  How can you care for yourself at home?  Taking care of your body  Use pads instead of tampons for bleeding. After birth, you will have bloody vaginal discharge. You may also pass some blood clots that shouldn't be bigger than an egg. Over the next 6 weeks or so, your bleeding should decrease a little every day and slowly change to a pinkish and then whitish discharge.  For cramps or mild pain, try an over-the-counter pain medicine, such as acetaminophen (Tylenol) or ibuprofen (Advil, Motrin). Read and follow all instructions on the label.  To ease pain around the vagina or from hemorrhoids:  Put ice or a cold pack on the area for 10 to 20 minutes at a time. Put a thin cloth between the ice and your skin.  Try sitting in a few inches of warm water (sitz bath) when you can or after bowel movements.  Clean yourself with a gentle squeeze of warm water from a bottle instead of wiping with toilet paper.  Use witch hazel or hemorrhoid pads (such as Tucks).  Try using a cold compress for sore and swollen breasts. And wear a supportive bra that fits.  Ease constipation by drinking plenty of fluids and eating high-fiber foods. Ask your doctor or midwife about over-the-counter stool softeners.  Activity  Rest when you can.  Ask for help from family or friends when you need it.  If you can, have another adult in your home for at least 2 or 3 days after birth.  When you feel ready, try to get some exercise every day. For many people, walking is a good choice. Don't do any heavy exercise until your doctor or midwife says it's okay.  Ask your doctor or midwife when it is  okay to have vaginal sex.  If you don't want to get pregnant, talk to your doctor or midwife about birth control options. You can get pregnant even before your period returns. Also, you can get pregnant while you are breastfeeding.  Talk to your doctor or midwife if you want to get pregnant again. They can talk to you about when it is safe.  Emotional health  It's normal to have some sadness, anxiety, and mood swings after delivery. It may help to talk with a trusted friend or family member. You can also call the Maternal Mental Health Hotline at 4-460-[facility] (1-319.730.2889) for support. If these mood changes last more than a couple of weeks, talk to your doctor or midwife.  When should you call for help?  Share this information with your partner, family, or a friend. They can help you watch for warning signs.  Call 911  anytime you think you may need emergency care. For example, call if:    You feel you cannot stop from hurting yourself, your baby, or someone else.     You passed out (lost consciousness).     You have chest pain, are short of breath, or cough up blood.     You have a seizure.   Where to get help 24 hours a day, 7 days a week   If you or someone you know talks about suicide, self-harm, a mental health crisis, a substance use crisis, or any other kind of emotional distress, get help right away. You can:    Call the Suicide and Crisis Lifeline at 624.     Call 1-755-532-TALK (1-839.220.7931).     Text HOME to 519936 to access the Crisis Text Line.   Consider saving these numbers in your phone.  Go to [email or web address] for more information or to chat online.  Call your doctor or midwife now or seek immediate medical care if:    You have signs of hemorrhage (too much bleeding), such as:  Heavy vaginal bleeding. This means that you are soaking through one or more pads in an hour. Or you pass blood clots bigger than an egg.  Feeling dizzy or lightheaded, or you feel like you may faint.  Feeling so tired  "or weak that you cannot do your usual activities.  A fast or irregular heartbeat.  New or worse belly pain.     You have signs of infection, such as:  A fever.  Increased pain, swelling, warmth, or redness from an incision or wound.  Frequent or painful urination or blood in your urine.  Vaginal discharge that smells bad.  New or worse belly pain.     You have symptoms of a blood clot in your leg (called a deep vein thrombosis), such as:  Pain in the calf, back of the knee, thigh, or groin.  Swelling in the leg or groin.  A color change on the leg or groin. The skin may be reddish or purplish, depending on your usual skin color.     You have signs of preeclampsia, such as:  Sudden swelling of your face, hands, or feet.  New vision problems (such as dimness, blurring, or seeing spots).  A severe headache.     You have signs of heart failure, such as:  New or increased shortness of breath.  New or worse swelling in your legs, ankles, or feet.  Sudden weight gain, such as more than 2 to 3 pounds in a day or 5 pounds in a week.  Feeling so tired or weak that you cannot do your usual activities.     You had spinal or epidural pain relief and have:  New or worse back pain.  Increased pain, swelling, warmth, or redness at the injection site.  Tingling, weakness, or numbness in your legs or groin.   Watch closely for changes in your health, and be sure to contact your doctor or midwife if:    Your vaginal bleeding isn't decreasing.     You feel sad, anxious, or hopeless for more than a few days.     You are having problems with your breasts or breastfeeding.   Where can you learn more?  Go to https://www.[employer].net/patiented  Enter A461 in the search box to learn more about \"After Your Delivery (the Postpartum Period): Care Instructions.\"  Current as of: July 10, 2023               Content Version: 14.0    5346-7650 Healthwise[employer] Incorporated.   Care instructions adapted under license by your healthcare professional. If " you have questions about a medical condition or this instruction, always ask your healthcare professional. [employer] disclaims any warranty or liability for your use of this information.     Section: What to Expect at Home  Your Recovery     A  section, or , is surgery to deliver your baby through a cut that the doctor makes in your lower belly and uterus. The cut is called an incision.  You may have some pain in your lower belly and need pain medicine for 1 to 2 weeks. You can expect some vaginal bleeding for several weeks. You will probably need about 6 weeks to fully recover.  It's important to take it easy while the incision heals. Avoid heavy lifting, strenuous activities, and exercises that strain the belly muscles while you recover. Ask a family member or friend for help with housework, cooking, and shopping.  This care sheet gives you a general idea about how long it will take for you to recover. But each person recovers at a different pace. Follow the steps below to get better as quickly as possible.  How can you care for yourself at home?  Activity    Rest when you feel tired. Getting enough sleep will help you recover.     Try to walk each day. Start by walking a little more than you did the day before. Bit by bit, increase the amount you walk. Walking boosts blood flow and helps prevent pneumonia, constipation, and blood clots.     Avoid strenuous activities, such as bicycle riding, jogging, weightlifting, and aerobic exercise, for 6 weeks or until your doctor says it is okay.     Until your doctor says it is okay, do not lift anything heavier than your baby.     Do not do sit-ups or other exercises that strain the belly muscles for 6 weeks or until your doctor says it is okay.     Hold a pillow over your incision when you cough or take deep breaths. This will support your belly and decrease your pain.     You may shower as usual. Pat the incision dry when you are  done.     You will have some vaginal bleeding. Wear sanitary pads. Do not douche or use tampons until your doctor says it is okay.     Ask your doctor when you can drive again.     You will probably need to take at least 6 weeks off work. It depends on the type of work you do and how you feel.     Ask your doctor when it is okay for you to have sex.   Diet    You can eat your normal diet. If your stomach is upset, try bland, low-fat foods like plain rice, broiled chicken, toast, and yogurt.     Drink plenty of fluids (unless your doctor tells you not to).     You may notice that your bowel movements are not regular right after your surgery. This is common. Try to avoid constipation and straining with bowel movements. You may want to take a fiber supplement every day. If you have not had a bowel movement after a couple of days, ask your doctor about taking a mild laxative.     If you are breastfeeding, limit alcohol. Alcohol can cause a lack of energy and other health problems for the baby when a breastfeeding woman drinks heavily. It can also get in the way of a mom's ability to feed her baby or to care for the child in other ways. There isn't a lot of research about exactly how much alcohol can harm a baby. Having no alcohol is the safest choice for your baby. If you choose to have a drink now and then, have only one drink, and limit the number of occasions that you have a drink. Wait to breastfeed at least 2 hours after you have a drink to reduce the amount of alcohol the baby may get in the milk.   Medicines    Your doctor will tell you if and when you can restart your medicines. You will also get instructions about taking any new medicines.     If you stopped taking aspirin or some other blood thinner, your doctor will tell you when to start taking it again.     Take pain medicines exactly as directed.  If the doctor gave you a prescription medicine for pain, take it as prescribed.  If you are not taking a  prescription pain medicine, ask your doctor if you can take an over-the-counter medicine.     If you think your pain medicine is making you sick to your stomach:  Take your medicine after meals (unless your doctor has told you not to).  Ask your doctor for a different pain medicine.     If your doctor prescribed antibiotics, take them as directed. Do not stop taking them just because you feel better. You need to take the full course of antibiotics.   Incision care    If you have strips of tape on the incision, leave the tape on for a week or until it falls off.     Wash the area daily with warm, soapy water, and pat it dry. Don't use hydrogen peroxide or alcohol, which can slow healing. You may cover the area with a gauze bandage if it weeps or rubs against clothing. Change the bandage every day.     Keep the area clean and dry.   Other instructions    If you breastfeed your baby, you may be more comfortable while you are healing if you don't rest your baby on your belly. Try tucking your baby under your arm, with your baby's body along the side you will be feeding on. Support your baby's upper body with your arm. With that hand you can control your baby's head to bring your baby's mouth to your breast. This is sometimes called the football hold.   Follow-up care is a key part of your treatment and safety. Be sure to make and go to all appointments, and call your doctor if you are having problems. It's also a good idea to know your test results and keep a list of the medicines you take.  When should you call for help?  Share this information with your partner, family, or a friend. They can help you watch for warning signs.  Call 911  anytime you think you may need emergency care. For example, call if:    You feel you cannot stop from hurting yourself, your baby, or someone else.     You passed out (lost consciousness).     You have chest pain, are short of breath, or cough up blood.     You have a seizure.   Where  to get help 24 hours a day, 7 days a week   If you or someone you know talks about suicide, self-harm, a mental health crisis, a substance use crisis, or any other kind of emotional distress, get help right away. You can:    Call the Suicide and Crisis Lifeline at 988.     Call 3-741-752-TALK (1-488.468.6303).     Text HOME to 260536 to access the Crisis Text Line.   Consider saving these numbers in your phone.  Go to [employer] for more information or to chat online.  Call your doctor or midwife now or seek immediate medical care if:    You have loose stitches, or your incision comes open.     You have signs of hemorrhage (too much bleeding), such as:  Heavy vaginal bleeding. This means that you are soaking through one or more pads in an hour. Or you pass blood clots bigger than an egg.  Feeling dizzy or lightheaded, or you feel like you may faint.  Feeling so tired or weak that you cannot do your usual activities.  A fast or irregular heartbeat.  New or worse belly pain.     You have symptoms of infection, such as:  Increased pain, swelling, warmth, or redness.  Red streaks leading from the incision.  Pus draining from the incision.  A fever.  Frequent or painful urination or blood in your urine.  Vaginal discharge that smells bad.  New or worse belly pain.     You have symptoms of a blood clot in your leg (called a deep vein thrombosis), such as:  Pain in the calf, back of the knee, thigh, or groin.  Swelling in the leg or groin.  A color change on the leg or groin. The skin may be reddish or purplish, depending on your usual skin color.     You have signs of preeclampsia, such as:  Sudden swelling of your face, hands, or feet.  New vision problems (such as dimness, blurring, or seeing spots).  A severe headache.     You have signs of heart failure, such as:  New or increased shortness of breath.  New or worse swelling in your legs, ankles, or feet.  Sudden weight gain, such as more than 2 to 3 pounds in a  "day or 5 pounds in a week.  Feeling so tired or weak that you cannot do your usual activities.     You had spinal or epidural pain relief and have:  New or worse back pain.  Increased pain, swelling, warmth, or redness at the injection site.  Tingling, weakness, or numbness in your legs or groin.   Watch closely for changes in your health, and be sure to contact your doctor or midwife if:    Your vaginal bleeding isn't decreasing.     You feel sad, anxious, or hopeless for more than a few days.     You are having problems with your breasts or breastfeeding.   Where can you learn more?  Go to https://www.[employer].net/patiented  Enter M806 in the search box to learn more about \" Section: What to Expect at Home.\"  Current as of: July 10, 2023               Content Version: 14.0    7089-5338 [employer].   Care instructions adapted under license by your healthcare professional. If you have questions about a medical condition or this instruction, always ask your healthcare professional. [employer] disclaims any warranty or liability for your use of this information.        "

## 2024-03-16 NOTE — PROVIDER NOTIFICATION
"   03/15/24 1915   Provider Notification   Provider Name/Title Dr. Kulkarni (G2)   Method of Notification Electronic Page   Request Evaluate-Remote   Notification Reason Status Update     BRITTNI ANDRADE E - Room:  - : [birth year]/ (33y)    MS  Read - 7:13 pm  Pt reporting occasional dizziness and reported having \"sparkles\" in her vision earlier when her mother visited. Recent BP was 125/78. Any further orders?    WILLIAN Kulkarni - 7:14 pm  What are her vitals?    MS  Read - 7:14 pm  I just grabbed her BP and pulse but I can head back in quick. She just had to use the bathroom  MS  Read - 7:15 pm  Pulse was 98    WILLIAN Kulkarni - 7:15 pm  That's fine, if she is not currently reporting dizziness we can just observe. If she has constant   dizziness we can repeat a HGB.  WILLIAN Kulkarni - 7:16 pm  Not as concerned about sparkles    MS  Read - 7:16 pm  Sounds good, she just hopped into the shower a few minutes ago.  "

## 2024-03-16 NOTE — PLAN OF CARE
Goal Outcome Evaluation:       Shift 8589-7415    VSS. Voiding independently. Taking tylenol and ibuprofen scheduled. Bonding well with infant in room. Patient is independently hand pumping followed by hand expressing every 2-3 hours. Chief complaint overnight was muscular pain in shoulder and neck, no further complaints of dizziness or sparkling vision as reported in previous shifts.

## 2024-03-16 NOTE — PROGRESS NOTES
Obstetrics Postpartum Progress Note  DOS: 03/15/24  MRN: [id number]    POD#2 after RLTCS, BS    S: Patient states she is doing well.  Pain well controlled with current pain meds. Lochia wnl.  Eating and drinking without nausea/vomiting.  She is passing flatus yet.  Ambulating without difficulty.  Voiding without difficulty.  Denies headaches, vision changes, chest pain, SOB.  Breastfeeding with some difficulty. No other acute concerns.  O:  Vitals:    03/15/24 0050 03/15/24 0525 03/15/24 0747 03/15/24 1208   BP: 112/79 118/72 121/79 107/70   BP Location: Left arm Left arm Left arm    Patient Position: Semi-Tejada's Semi-Tejada's Semi-Tejada's    Cuff Size: Adult Large Adult Large Adult Large    Pulse: 86 94 78 87   Resp: 18  16 18   Temp: 97.9  F (36.6  C)  98.2  F (36.8  C) 97.8  F (36.6  C)   TempSrc: Oral  Oral Oral   SpO2:   100% 100%   Weight:       Height:             Gen:  NAD,   CV: Regular rate, well perfused  Resp: Nonlabored on room air, normal inspiratory effort  Abd: soft, nondistended, nontender, fundus firm at  below umbilicus  Incision:  clean, dry, intact  Ext: mild edema          Intake/Output Summary (Last 24 hours) at 3/16/2024 0720  Last data filed at 3/15/2024 1530  Gross per 24 hour   Intake --   Output 1400 ml   Net -1400 ml       Weight:   Vitals:    24 0904   Weight: 135.2 kg (298 lb)         Labs:  Hemoglobin   Date Value Ref Range Status   03/15/2024 9.2 (L) 11.7 - 15.7 g/dL Final   2024 11.3 (L) 11.7 - 15.7 g/dL Final   2011 13.9 11.7 - 15.7 g/dL Final         Assessment and Plan: 33 year old  POD#2 s/p RLTCS BS, doing well postpartum. Pregnancy was complicated by CHTN, HELEN/MDD and severe polyhydramnios    #Anxiety/depression  - PTA venlafaxine and guanfacine  - Follows closely with therapist twice a month  - Mood is stable and manageable at this time     #cHTN  - Serial BP monitoring   - IV Antihypertensives prn for sustained severe range blood pressures  (>160/>110)  - Continue PTA labetalol 100mg BID  - Labs: HELLP labs on admission, nl, repeat 3/15/24 also normal     #GERD  - PTA Omeprazole 20mg BID     #Asthma     #Tachycardia  - s/p Ziopatch x2 weeks: min heart rate of 63 bpm, max of 180 bpm, average 95 bpm. No abnormalities noted in preliminary reading.  - Normal TSH of 2.31 on 12/8    Routine postpartum:  Heme: Hgb 11.3 >  > 9.2 No symptoms of ABLA. Will discharge home w/ PO iron.  Pain: well-controlled with tylenol, ibuprofen and oxycodone prn, continue.  Rh pos/Rubella immune. No intervention required.  Feeding: Breast and pumping  :  s/p patel, voiding  PPX: Encourage ambulation, Lovenox  Continue regular diet, scheduled bowel regimen, prn antiemetics  Contraception: BS    Dispo: Anticipate discharge home today.    Alverto Kulkarni DO, MA  [facility]- PGY2  7:21 AM March 16, 2024       Appreciate Dr. Kulkarni's note above, patient also seen and examined by me. I agree with the note above.   Radha Man MD

## 2024-03-18 ENCOUNTER — TELEPHONE (OUTPATIENT)
Dept: MATERNAL FETAL MEDICINE | Facility: CLINIC | Age: 34
End: 2024-03-18
Payer: COMMERCIAL

## 2024-03-18 NOTE — TELEPHONE ENCOUNTER
LVM for Irina to call back as she has not yet completed [place] task for [place] BP program. Will await return call to ensure she has correct instructions for [facility].

## 2024-03-19 ENCOUNTER — PATIENT OUTREACH (OUTPATIENT)
Dept: CARE COORDINATION | Facility: CLINIC | Age: 34
End: 2024-03-19
Payer: COMMERCIAL

## 2024-03-19 NOTE — PROGRESS NOTES
Clinic Care Coordination Contact  [facility]: Post-Discharge Note  SITUATION                                                      Admission:    Admission Date: 24   Reason for Admission: Last attending  Treatment team CARDIOVASCULAR SCREENING; LDL GOAL LESS THAN 160  Discharge:   Discharge Date: 24  Discharge Diagnosis: Last attending  Treatment team CARDIOVASCULAR SCREENING; LDL GOAL LESS THAN 160    BACKGROUND                                                      Irina Alcantara is a 33 year old  at 37w0d admitted for scheduled repeat  section in setting of severe polyhydramnios, cHTN.  The risks, benefits, and alternatives of  section were discussed with the patient, and she agreed to proceed.      Please see her admit H&P for full details of her PMH, PSH, Meds, Allergies and exam on admit.     Operative Course:  Surgery was uncomplicated. EBL from the delivery was 570 mL. Please see her  Section Operative Note for full details regarding her delivery.     Operative Findings:   Findings:   Clear amniotic fluid  Liveborn infant in cephalic presentation. Apgars 6 at 1 minute & 8 at 5 minutes. Weight 3515g  Normal uterus, fallopian tubes, and ovaries.     ASSESSMENT           Discharge Assessment  How are you doing now that you are home?: Patient shares that she is doing well and didn't have any concerns. However, she does state that she thought she was going to get a 6 month refill of blood pressure meds and didn't get them Writer does not see this in AVS and asks where patient gets the medication and she states from PCP. Patient is in office now for  checkup and writer suggest she ask PCP to refill this. Patient also shares that pharmacy is working with PCP to get refill. No other needs at this time.  How are your symptoms? (Red Flag symptoms escalate to triage hotline per guidelines): Improved  Do you feel your condition is stable enough to be safe at home  until your provider visit?: Yes  Does the patient have their discharge instructions? : Yes  Does the patient have questions regarding their discharge instructions? : No  Were you started on any new medications or were there changes to any of your previous medications? : Yes  Does the patient have all of their medications?: Yes  Do you have questions regarding any of your medications? : No  Do you have all of your needed medical supplies or equipment (DME)?  (i.e. oxygen tank, CPAP, cane, etc.): Yes  Discharge follow-up appointment scheduled within 14 calendar days? : No  Is patient agreeable to assistance with scheduling? : No (Patient will call to schedule 6 week follow up)    Post-op (W [facility]A Only)  If the patient had a surgery or procedure, do they have any questions for a nurse?: No             PLAN                                                      Outpatient Plan:  Follow up with provider in 6 weeks for post-delivery check  Follow Up and recommended labs and tests    No future appointments.      For any urgent concerns, please contact our 24 hour nurse triage line: [phone] ([phone])         ARLET Drew

## 2024-03-20 LAB
PATH REPORT.COMMENTS IMP SPEC: NORMAL
PATH REPORT.COMMENTS IMP SPEC: NORMAL
PATH REPORT.FINAL DX SPEC: NORMAL
PATH REPORT.GROSS SPEC: NORMAL
PATH REPORT.MICROSCOPIC SPEC OTHER STN: NORMAL
PATH REPORT.RELEVANT HX SPEC: NORMAL
PHOTO IMAGE: NORMAL

## 2024-03-21 ENCOUNTER — TELEPHONE (OUTPATIENT)
Dept: OBGYN | Facility: CLINIC | Age: 34
End: 2024-03-21
Payer: COMMERCIAL

## 2024-03-21 NOTE — TELEPHONE ENCOUNTER
"Received the following refill request:    \"Refills have been requested for the following medications:         oxyCODONE (ROXICODONE) 5 MG tablet [Radha Man]       Patient Comment: Having back pain still with spasms.     Preferred pharmacy: [facility] - [place], [place] - [address] [place] [address]. W\"    Called patient to discuss back pain further. Of note, patient is a  who had c/section on .   "

## 2024-11-20 ENCOUNTER — TELEPHONE (OUTPATIENT)
Dept: NURSING | Facility: CLINIC | Age: 34
End: 2024-11-20
Payer: COMMERCIAL

## 2024-11-21 NOTE — TELEPHONE ENCOUNTER
"Telephone Encounter:    Patient calling to inquire if ED would be able to help with swollen and painful throat and ear. She reports noticing large \"white chunks\". She declined triage.     Informed patient that the ED would be equipped to assess her symptoms and treat if necessary. She inquired if it could be contagious to her 8 month old and advised to discuss with ED provider once they know the cause.    No additional concerns or questions.    Vicky Mayen RN  11/20/24 6:30 PM  [facility] Nurse Advisor  "

## 2025-01-11 ENCOUNTER — HEALTH MAINTENANCE LETTER (OUTPATIENT)
Age: 35
End: 2025-01-11

## 2025-07-19 ENCOUNTER — NURSE TRIAGE (OUTPATIENT)
Dept: NURSING | Facility: CLINIC | Age: 35
End: 2025-07-19
Payer: COMMERCIAL

## 2025-07-19 NOTE — TELEPHONE ENCOUNTER
Nurse Triage SBAR    Is this a 2nd Level Triage? {No_Yes 2nd level triage:060848}    Situation: ***    Background: ***    Assessment: ***    Protocol Recommended Disposition: {JDISPOSITION:085998}

## 2025-07-19 NOTE — TELEPHONE ENCOUNTER
Nurse Triage SBAR    Is this a 2nd Level Triage? YES, LICENSED PRACTITIONER REVIEW IS REQUIRED    Situation and Background: Patient calling, trying to get an emergency supply of her Effexor. Pt states this medication is prescribed by her provider, Dr. Madonna Moss at [facility] in [place]. The pharmacy she uses for this is unexpectedly closed today. She tried reaching the provider on-call through her mental health clinic but there's no provider available.     Her PCP is Dr. Machado at [facility]. Pt states she believes she's seen him within this past year.     Pt stated dosing: Effexor 150mg daily (one cap)    Assessment:     Pt tells me that she will get horrible withdrawal symptoms if she goes without the medication.This has happened to her before.     Pharmacy for today: [facility]       Protocol Recommended Disposition: Page/Call Provider    Non-Primary Care Provider (Specialties/Contract Clinics)    Provider consult indicated.     Reason for page: Med refill    Page sent to Dr. Guevara ([facility]) by RN at 12:30PM via phone call. Dr. Guevara answered the call and will send in a 30 day refill of Effexor to the pharmacy.     RN called pt back and gave her the information. Patient verbalized understanding and had no further questions.         Bree Connor RN  [place] Nurse Advisor  7/19/2025 12:46 PM                               Reason for Disposition   [1] Prescription refill request for ESSENTIAL medicine (i.e., likelihood of harm to patient if not taken) AND [2] triager unable to refill per department policy     Patient states she gets horrible side effects if she goes without this medication. She will run out today.    Additional Information   Negative: New-onset or worsening symptoms, see that guideline (e.g., diarrhea, runny nose, sore throat)   Negative: Medicine question not related to refill or renewal   Negative: Caller (e.g., patient or pharmacist) requesting  information about a new medicine   Negative: Caller requesting information unrelated to medicine    Protocols used: Medication Refill and Renewal Call-A-AH

## (undated) DEVICE — SU VICRYL 3-0 CTX 36" UND J980H

## (undated) DEVICE — PREP CHLORAPREP 26ML TINTED ORANGE  260815

## (undated) DEVICE — ESU LIGASURE OPEN SEALER/DIVIDER SM JAW 16.5MM LF1212A

## (undated) DEVICE — PACK C-SECTION LF PL15OTA83B

## (undated) DEVICE — SU VICRYL 0 CT-1 36" J346H

## (undated) DEVICE — STOCKING SLEEVE COMPRESSION CALF LG

## (undated) DEVICE — SU MONOCRYL 4-0 PS-2 18" UND Y496G

## (undated) DEVICE — ESU PENCIL W/SMOKE EVAC NEPTUNE STRYKER 0703-046-000

## (undated) DEVICE — STRAP KNEE/BODY 31143004

## (undated) DEVICE — SOL NACL 0.9% IRRIG 1000ML BOTTLE 07138-09

## (undated) DEVICE — GLOVE ESTEEM POWDER FREE SMT 6.5  2D72PT65

## (undated) DEVICE — SOL WATER IRRIG 1000ML BOTTLE 07139-09

## (undated) DEVICE — CATH TRAY FOLEY 16FR BARDEX W/DRAIN BAG STATLOCK 300316A

## (undated) DEVICE — GLOVE PROTEXIS BLUE W/NEU-THERA 6.5  2D73EB65

## (undated) DEVICE — SU VICRYL 3-0 SH 27" J316H

## (undated) DEVICE — DRSG STERI STRIP 1/2X4" R1547

## (undated) DEVICE — SU MONOCRYL 0 CT-1 36" Y346H

## (undated) RX ORDER — KETOROLAC TROMETHAMINE 30 MG/ML
INJECTION, SOLUTION INTRAMUSCULAR; INTRAVENOUS
Status: DISPENSED
Start: 2024-03-14

## (undated) RX ORDER — BUPIVACAINE HYDROCHLORIDE 2.5 MG/ML
INJECTION, SOLUTION EPIDURAL; INFILTRATION; INTRACAUDAL
Status: DISPENSED
Start: 2024-03-14

## (undated) RX ORDER — EPINEPHRINE 1 MG/ML
INJECTION, SOLUTION, CONCENTRATE INTRAVENOUS
Status: DISPENSED
Start: 2024-03-14

## (undated) RX ORDER — OXYTOCIN/0.9 % SODIUM CHLORIDE 30/500 ML
PLASTIC BAG, INJECTION (ML) INTRAVENOUS
Status: DISPENSED
Start: 2024-03-14

## (undated) RX ORDER — ONDANSETRON 2 MG/ML
INJECTION INTRAMUSCULAR; INTRAVENOUS
Status: DISPENSED
Start: 2024-03-14

## (undated) RX ORDER — EPHEDRINE SULFATE 50 MG/ML
INJECTION, SOLUTION INTRAMUSCULAR; INTRAVENOUS; SUBCUTANEOUS
Status: DISPENSED
Start: 2024-03-14

## (undated) RX ORDER — EPHEDRINE SULFATE 50 MG/ML
INJECTION, SOLUTION INTRAVENOUS
Status: DISPENSED
Start: 2024-03-14

## (undated) RX ORDER — PHENYLEPHRINE HCL IN 0.9% NACL 50MG/250ML
PLASTIC BAG, INJECTION (ML) INTRAVENOUS
Status: DISPENSED
Start: 2024-03-14

## (undated) RX ORDER — FENTANYL CITRATE 50 UG/ML
INJECTION, SOLUTION INTRAMUSCULAR; INTRAVENOUS
Status: DISPENSED
Start: 2024-03-14

## (undated) RX ORDER — TRANEXAMIC ACID 10 MG/ML
INJECTION, SOLUTION INTRAVENOUS
Status: DISPENSED
Start: 2024-03-14

## (undated) RX ORDER — MORPHINE SULFATE 1 MG/ML
INJECTION, SOLUTION EPIDURAL; INTRATHECAL; INTRAVENOUS
Status: DISPENSED
Start: 2024-03-14

## (undated) RX ORDER — DEXAMETHASONE SODIUM PHOSPHATE 4 MG/ML
INJECTION, SOLUTION INTRA-ARTICULAR; INTRALESIONAL; INTRAMUSCULAR; INTRAVENOUS; SOFT TISSUE
Status: DISPENSED
Start: 2024-03-14